# Patient Record
Sex: FEMALE | Race: BLACK OR AFRICAN AMERICAN | Employment: UNEMPLOYED | URBAN - METROPOLITAN AREA
[De-identification: names, ages, dates, MRNs, and addresses within clinical notes are randomized per-mention and may not be internally consistent; named-entity substitution may affect disease eponyms.]

---
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
## 2022-02-27 ENCOUNTER — HOSPITAL ENCOUNTER (EMERGENCY)
Age: 22
Discharge: HOME | End: 2022-02-27
Attending: STUDENT IN AN ORGANIZED HEALTH CARE EDUCATION/TRAINING PROGRAM
Payer: COMMERCIAL

## 2022-02-27 VITALS
HEIGHT: 62 IN | TEMPERATURE: 98.8 F | RESPIRATION RATE: 18 BRPM | WEIGHT: 177.69 LBS | HEART RATE: 92 BPM | SYSTOLIC BLOOD PRESSURE: 127 MMHG | BODY MASS INDEX: 32.7 KG/M2 | DIASTOLIC BLOOD PRESSURE: 74 MMHG | OXYGEN SATURATION: 100 %

## 2022-02-27 DIAGNOSIS — S46.911A STRAIN OF RIGHT UPPER ARM, INITIAL ENCOUNTER: ICD-10-CM
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
## 2022-02-27 DIAGNOSIS — S16.1XXA STRAIN OF NECK MUSCLE, INITIAL ENCOUNTER: Primary | ICD-10-CM

## 2022-02-27 DIAGNOSIS — M54.9 MUSCULOSKELETAL BACK PAIN: ICD-10-CM

## 2022-02-27 PROCEDURE — 74011250636 HC RX REV CODE- 250/636: Performed by: STUDENT IN AN ORGANIZED HEALTH CARE EDUCATION/TRAINING PROGRAM

## 2022-02-27 PROCEDURE — 96372 THER/PROPH/DIAG INJ SC/IM: CPT

## 2022-02-27 PROCEDURE — 99284 EMERGENCY DEPT VISIT MOD MDM: CPT

## 2022-02-27 RX ORDER — METHOCARBAMOL 500 MG/1
500 TABLET, FILM COATED ORAL
Qty: 30 TABLET | Refills: 0 | OUTPATIENT
Start: 2022-02-27 | End: 2022-05-23

## 2022-02-27 RX ORDER — KETOROLAC TROMETHAMINE 30 MG/ML
30 INJECTION, SOLUTION INTRAMUSCULAR; INTRAVENOUS
Status: COMPLETED | OUTPATIENT
Start: 2022-02-27 | End: 2022-02-27

## 2022-02-27 RX ORDER — LIDOCAINE 40 MG/G
1 CREAM TOPICAL
Qty: 15 G | Refills: 0 | OUTPATIENT
Start: 2022-02-27 | End: 2022-05-23

## 2022-02-27 RX ORDER — NAPROXEN 500 MG/1
500 TABLET ORAL
Qty: 20 TABLET | Refills: 0 | OUTPATIENT
Start: 2022-02-27 | End: 2022-05-23

## 2022-02-27 RX ADMIN — KETOROLAC TROMETHAMINE 30 MG: 30 INJECTION, SOLUTION INTRAMUSCULAR; INTRAVENOUS at 08:06

## 2022-02-27 NOTE — Clinical Note
[facility] EMERGENCY DEPT  [address] [facility] [id number] [phone]    Work/School Note    Date: 2/27/2022    To Whom It May concern:      Governor Wolf was seen and treated today in the emergency room by the following provider(s):  Attending Provider: Kushal Contreras MD.      Governor Wolf is excused from work/school on 02/27/22. She is clear to return to work/school on 02/28/22.         Sincerely,          Vivian Persaud MD

## 2022-02-27 NOTE — ED PROVIDER NOTES
EMERGENCY DEPARTMENT HISTORY AND PHYSICAL EXAM      Date: 2/27/2022  Patient Name: Hector Gama    History of Presenting Illness     Chief Complaint   Patient presents with    Back Pain     History Provided By: Patient    HPI: Hector Gama, 25 y.o. female with past medical history as listed below presents to the ED with cc of neck pain, diffuse back pain, and right upper arm pain. Patient reports symptoms began yesterday. Describes pain characteristic as a muscle soreness. she denies any specific injury or trauma to the affected areas. 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 States that these food trucks are rather heavy. In addition, patient also reports being mother of several young children, whom she has to hold for extended periods of time in her arms at home. She is right-handed. Patient reports taking ibuprofen last night and this morning, without significant relief in her symptoms. PCP: Yogesh Rowland MD    No current facility-administered medications on file prior to encounter. Current Outpatient Medications on File Prior to Encounter   Medication Sig Dispense Refill    ondansetron (Zofran ODT) 8 mg disintegrating tablet Take 1 Tablet by mouth every eight (8) hours as needed for Nausea or Vomiting. 12 Tablet 0    guaiFENesin (ROBITUSSIN) 100 mg/5 mL liquid Take 20 mL by mouth three (3) times daily as needed for Cough. (Patient not taking: Reported on 10/20/2021) 1 Bottle 0    labetaloL (NORMODYNE) 200 mg tablet Take 1 Tab by mouth every twelve (12) hours. (Patient not taking: Reported on 10/20/2021) 60 Tab 1    acetaminophen (TYLENOL) 325 mg tablet Take 2 Tabs by mouth every four (4) hours as needed for Pain. 40 Tab 1    fluticasone propion-salmeteroL (ADVAIR/WIXELA) 250-50 mcg/dose diskus inhaler Take 1 Puff by inhalation two (2) times a day. 1 Inhaler 1    sertraline (Zoloft) 50 mg tablet Take 50 mg by mouth daily.       docusate sodium (Colace) 100 mg capsule Take 100 mg by mouth two (2) times a day. (Patient not taking: Reported on 10/20/2021)      PNV OR.95/PGVZDYK fum/folic ac (PRENATAL PO) Take  by mouth.  Lisdexamfetamine (Vyvanse) 70 mg cap 40 mg daily.  Cetirizine (ZYRTEC) 10 mg cap Take  by mouth.  oxcarbazepine (TRILEPTAL) 150 mg tablet 450 mg daily. Past History     Past Medical History:  Past Medical History:   Diagnosis Date    ADHD (attention deficit hyperactivity disorder)     Allergic rhinitis     Asthma     Depression     Epilepsy ([facility] [address]     last at age 11    Hyperhidrosis     Non-insulin-dependent diabetes mellitus without complications     reports pre-diabetic    Pre-eclampsia, antepartum, third trimester 2/13/2021    Psychiatric disorder     ADHD    Seizures ([facility] [address]        Past Surgical History:  Past Surgical History:   Procedure Laterality Date    HX OTHER SURGICAL      reports colon surgery \"flushed me out\"       Family History:  Family History   Problem Relation Age of Onset    Seizures Brother     Hypertension Maternal Grandmother     Hypertension Mother     Hypertension Father     Diabetes Neg Hx     Elevated Lipids Neg Hx     Thyroid Disease Neg Hx        Social History:  Social History     Tobacco Use    Smoking status: Never Smoker    Smokeless tobacco: Never Used   Vaping Use    Vaping Use: Never used   Substance Use Topics    Alcohol use: No    Drug use: No       Allergies: Allergies   Allergen Reactions    Codeine Rash    Pcn [Penicillins] Rash         Review of Systems   Review of Systems   Constitutional: Negative for chills and fever. HENT: Negative for congestion and rhinorrhea. Eyes: Negative for visual disturbance. Respiratory: Negative for chest tightness and shortness of breath. Cardiovascular: Negative for chest pain and palpitations. Gastrointestinal: Negative for abdominal pain, diarrhea, nausea and vomiting.    Genitourinary: Negative for dysuria, flank pain and hematuria. Musculoskeletal: Positive for myalgias and neck pain. Negative for back pain, joint swelling and neck stiffness. Skin: Negative for rash. Allergic/Immunologic: Negative for immunocompromised state. Neurological: Negative for dizziness, speech difficulty, weakness and headaches. Hematological: Negative for adenopathy. Psychiatric/Behavioral: Negative for dysphoric mood and suicidal ideas. Physical Exam   Physical Exam  Vitals and nursing note reviewed. Constitutional:       General: She is not in acute distress. Appearance: She is not ill-appearing or toxic-appearing. HENT:      Head: Normocephalic and atraumatic. Nose: Nose normal.      Mouth/Throat:      Mouth: Mucous membranes are moist.   Eyes:      Extraocular Movements: Extraocular movements intact. Pupils: Pupils are equal, round, and reactive to light. Cardiovascular:      Rate and Rhythm: Normal rate and regular rhythm. Pulses: Normal pulses. Pulmonary:      Effort: Pulmonary effort is normal.      Breath sounds: No stridor. No wheezing or rhonchi. Abdominal:      General: Abdomen is flat. There is no distension. Tenderness: There is no abdominal tenderness. Musculoskeletal:         General: Normal range of motion. Right upper arm: Tenderness present. No swelling, edema, deformity, lacerations or bony tenderness. Left upper arm: Normal.      Cervical back: Normal range of motion and neck supple. No rigidity, bony tenderness or crepitus. Muscular tenderness present. No pain with movement or spinous process tenderness. Normal range of motion. Thoracic back: Tenderness present. No swelling, edema, deformity, signs of trauma or bony tenderness. Normal range of motion. Lumbar back: Tenderness present. No swelling, edema, deformity, signs of trauma, lacerations, spasms or bony tenderness. Normal range of motion.  Negative right straight leg raise test and negative left straight leg raise test. No scoliosis. Skin:     General: Skin is warm and dry. Neurological:      General: No focal deficit present. Mental Status: She is alert and oriented to person, place, and time. Psychiatric:         Judgment: Judgment normal.         Diagnostic Study Results     Labs -   No results found for this or any previous visit (from the past 24 hour(s)). Radiologic Studies -   No orders to display     CT Results  (Last 48 hours)    None        CXR Results  (Last 48 hours)    None          Medical Decision Making   IBolivar MD am the first provider for this patient, and I am the attending of record for this patient encounter. I reviewed the vital signs, available nursing notes, past medical history, past surgical history, family history and social history. Vital Signs-Reviewed the patient's vital signs. Patient Vitals for the past 24 hrs:   Temp Pulse Resp BP SpO2   02/27/22 0742 98.8 °F (37.1 °C) 92 18 127/74 100 %     Records Reviewed: Nursing Notes and Old Medical Records    Provider Notes (Medical Decision Making):   DDx: muscle strain, DDD, arthritis, overuse injury, tendon/ligamentous injury    Patient symptoms today are most likely secondary to musculoskeletal strain from overuse injury. She reports no preceding trauma to affected areas. No indication to obtain imaging studies. We will treat patient's symptoms with IM Toradol in the ED. Will discharge with Rx for lidocaine cream, naproxen, along with Robaxin. Follow-up with PCP advised. Will provide work note for today. ED Course:   Initial assessment performed. The patient's presenting problems have been discussed, and they are in agreement with the care plan formulated and outlined with them. I have encouraged them to ask questions as they arise throughout their visit. Bolivar Wyatt MD      Disposition:  Discharge      DISCHARGE PLAN:  1.    Current Discharge Medication List START taking these medications    Details   naproxen (NAPROSYN) 500 mg tablet Take 1 Tablet by mouth every twelve (12) hours as needed for Pain. Qty: 20 Tablet, Refills: 0  Start date: 2/27/2022      methocarbamoL (Robaxin) 500 mg tablet Take 1 Tablet by mouth three (3) times daily as needed for Muscle Spasm(s) or Pain. Qty: 30 Tablet, Refills: 0  Start date: 2/27/2022      lidocaine (XYLOCAINE) 4 % topical cream Apply 1 Tube to affected area four (4) times daily as needed for Pain. Qty: 15 g, Refills: 0  Start date: 2/27/2022           2. Follow-up Information     Follow up With Specialties Details Why Contact Info    Ricky Rosa MD Pediatric Medicine In 2 days  [address] [facility]  [address] [place] [address] [id number]          3. Return to ED if worse     Diagnosis     Clinical Impression:   1. Strain of neck muscle, initial encounter    2. Musculoskeletal back pain    3. Strain of right upper arm, initial encounter    4. Overuse injury        Attestations:    Lo Elizalde MD    Please note that this dictation was completed with [employer]ie, the [employer] voice recognition software. Quite often unanticipated grammatical, syntax, homophones, and other interpretive errors are inadvertently transcribed by the computer software. Please disregard these errors. Please excuse any errors that have escaped final proofreading. Thank you.

## 2022-02-27 NOTE — ED TRIAGE NOTES
Patient comes to the ED for evaluation and treatment of generalized back and bilateral arm pain causing her to leave her \"job\" this am.  Denied injury or trauma.   Stated vague nausea and \"weak\" feeling this am

## 2022-03-18 PROBLEM — O30.043 DICHORIONIC DIAMNIOTIC TWIN PREGNANCY IN THIRD TRIMESTER: Status: ACTIVE | Noted: 2021-02-13

## 2022-03-19 PROBLEM — O14.93 PRE-ECLAMPSIA, ANTEPARTUM, THIRD TRIMESTER: Status: ACTIVE | Noted: 2021-02-13

## 2022-03-19 PROBLEM — O14.93 PRE-ECLAMPSIA IN THIRD TRIMESTER: Status: ACTIVE | Noted: 2021-02-16

## 2022-03-19 PROBLEM — R73.9 ELEVATED BLOOD SUGAR LEVEL: Status: ACTIVE | Noted: 2017-10-18

## 2022-03-19 PROBLEM — O30.049 TWIN GESTATION, DICHORIONIC DIAMNIOTIC: Status: ACTIVE | Noted: 2021-02-16

## 2022-03-20 PROBLEM — O14.93 PREECLAMPSIA, THIRD TRIMESTER: Status: ACTIVE | Noted: 2021-02-16

## 2022-03-20 PROBLEM — O30.049 TWIN PREGNANCY, TWINS DICHORIONIC AND DIAMNIOTIC: Status: ACTIVE | Noted: 2021-02-16

## 2022-04-11 ENCOUNTER — HOSPITAL ENCOUNTER (EMERGENCY)
Age: 22
Discharge: HOME | End: 2022-04-11
Attending: EMERGENCY MEDICINE
Payer: COMMERCIAL

## 2022-04-11 VITALS
TEMPERATURE: 98.8 F | SYSTOLIC BLOOD PRESSURE: 118 MMHG | RESPIRATION RATE: 18 BRPM | HEART RATE: 86 BPM | OXYGEN SATURATION: 99 % | BODY MASS INDEX: 32.76 KG/M2 | HEIGHT: 62 IN | WEIGHT: 178 LBS | DIASTOLIC BLOOD PRESSURE: 71 MMHG

## 2022-04-11 DIAGNOSIS — J06.9 VIRAL UPPER RESPIRATORY TRACT INFECTION WITH COUGH: Primary | ICD-10-CM

## 2022-04-11 PROCEDURE — 99283 EMERGENCY DEPT VISIT LOW MDM: CPT

## 2022-04-11 RX ORDER — PSEUDOEPHEDRINE HCL 120 MG/1
120 TABLET, FILM COATED, EXTENDED RELEASE ORAL
Qty: 14 TABLET | Refills: 0 | Status: SHIPPED | OUTPATIENT
Start: 2022-04-11

## 2022-04-11 RX ORDER — BENZONATATE 100 MG/1
100 CAPSULE ORAL
Qty: 30 CAPSULE | Refills: 0 | Status: SHIPPED | OUTPATIENT
Start: 2022-04-11 | End: 2022-04-18

## 2022-04-12 NOTE — ED NOTES
Emergency Department Nursing Plan of Care       The Nursing Plan of Care is developed from the Nursing assessment and Emergency Department Attending provider initial evaluation. The plan of care may be reviewed in the ED Provider note.     The Plan of Care was developed with the following considerations:   Patient / Family readiness to learn indicated by:verbalized understanding  Persons(s) to be included in education: patient  Barriers to Learning/Limitations:No    Signed     Shayan Hernandez RN    4/11/2022   9:07 PM

## 2022-04-12 NOTE — ED PROVIDER NOTES
EMERGENCY DEPARTMENT HISTORY AND PHYSICAL EXAM      Date: 4/11/2022  Patient Name: Liane Castillo    History of Presenting Illness     Chief Complaint   Patient presents with    Cold Symptoms     congestion for a few days       History Provided By: Patient    HPI: Liane Castillo, 25 y.o. female with PMHx significant for asthma, presents to the ED with cc of URI symptoms for 3-4 days. She reports nasal congestion and productive cough with yellow sputum. Symptoms are gradually worsening. She has tried Mucinex, [place] cold, and Robitussin without relief of symptoms. She notes that her baby was sick with these symptoms first and passed it on to her. She denies fevers, sore throat, shortness of breath, chest pain. There are no other complaints, changes, or physical findings at this time. PCP: Jean Estrada MD    No current facility-administered medications on file prior to encounter. Current Outpatient Medications on File Prior to Encounter   Medication Sig Dispense Refill    naproxen (NAPROSYN) 500 mg tablet Take 1 Tablet by mouth every twelve (12) hours as needed for Pain. (Patient not taking: Reported on 4/11/2022) 20 Tablet 0    methocarbamoL (Robaxin) 500 mg tablet Take 1 Tablet by mouth three (3) times daily as needed for Muscle Spasm(s) or Pain. (Patient not taking: Reported on 4/11/2022) 30 Tablet 0    lidocaine (XYLOCAINE) 4 % topical cream Apply 1 Tube to affected area four (4) times daily as needed for Pain. (Patient not taking: Reported on 4/11/2022) 15 g 0    ondansetron (Zofran ODT) 8 mg disintegrating tablet Take 1 Tablet by mouth every eight (8) hours as needed for Nausea or Vomiting. (Patient not taking: Reported on 4/11/2022) 12 Tablet 0    guaiFENesin (ROBITUSSIN) 100 mg/5 mL liquid Take 20 mL by mouth three (3) times daily as needed for Cough.  (Patient not taking: Reported on 10/20/2021) 1 Bottle 0    labetaloL (NORMODYNE) 200 mg tablet Take 1 Tab by mouth every twelve (12) hours. (Patient not taking: Reported on 10/20/2021) 60 Tab 1    acetaminophen (TYLENOL) 325 mg tablet Take 2 Tabs by mouth every four (4) hours as needed for Pain. (Patient not taking: Reported on 4/11/2022) 40 Tab 1    fluticasone propion-salmeteroL (ADVAIR/WIXELA) 250-50 mcg/dose diskus inhaler Take 1 Puff by inhalation two (2) times a day. 1 Inhaler 1    sertraline (Zoloft) 50 mg tablet Take 50 mg by mouth daily. (Patient not taking: Reported on 4/11/2022)      docusate sodium (Colace) 100 mg capsule Take 100 mg by mouth two (2) times a day. (Patient not taking: Reported on 10/20/2021)      PNV QV.11/VLTLLLL fum/folic ac (PRENATAL PO) Take  by mouth. (Patient not taking: Reported on 4/11/2022)      Lisdexamfetamine (Vyvanse) 70 mg cap 40 mg daily. (Patient not taking: Reported on 4/11/2022)      Cetirizine (ZYRTEC) 10 mg cap Take  by mouth.  oxcarbazepine (TRILEPTAL) 150 mg tablet 450 mg daily.  (Patient not taking: Reported on 4/11/2022)         Past History     Past Medical History:  Past Medical History:   Diagnosis Date    ADHD (attention deficit hyperactivity disorder)     Allergic rhinitis     Asthma     Depression     Epilepsy ([facility] [address]     last at age 11    Hyperhidrosis     Non-insulin-dependent diabetes mellitus without complications     reports pre-diabetic    Pre-eclampsia, antepartum, third trimester 2/13/2021    Psychiatric disorder     ADHD    Seizures ([facility] [address]        Past Surgical History:  Past Surgical History:   Procedure Laterality Date    HX OTHER SURGICAL      reports colon surgery \"flushed me out\"       Family History:  Family History   Problem Relation Age of Onset    Seizures Brother     Hypertension Maternal Grandmother     Hypertension Mother     Hypertension Father     Diabetes Neg Hx     Elevated Lipids Neg Hx     Thyroid Disease Neg Hx        Social History:  Social History     Tobacco Use    Smoking status: Never Smoker    Smokeless tobacco: Never Used Vaping Use    Vaping Use: Never used   Substance Use Topics    Alcohol use: No    Drug use: No       Allergies: Allergies   Allergen Reactions    Codeine Rash    Pcn [Penicillins] Rash         Review of Systems   Review of Systems   Constitutional: Negative for chills and fever. HENT: Positive for congestion and sore throat. Negative for ear pain. Eyes: Negative for redness and visual disturbance. Respiratory: Positive for cough. Negative for shortness of breath. Cardiovascular: Negative for chest pain and palpitations. Gastrointestinal: Negative for abdominal pain, nausea and vomiting. Genitourinary: Negative for dysuria and hematuria. Musculoskeletal: Negative for back pain and gait problem. Skin: Negative for rash and wound. Neurological: Negative for dizziness and headaches. Psychiatric/Behavioral: Negative for behavioral problems and confusion. All other systems reviewed and are negative. Physical Exam   Physical Exam  Constitutional:       Appearance: She is not toxic-appearing. Comments: Interactive, well-appearing female. HENT:      Head: Normocephalic and atraumatic. Nose: Congestion present. Mouth/Throat:      Mouth: Mucous membranes are moist.   Eyes:      Extraocular Movements: Extraocular movements intact. Pupils: Pupils are equal, round, and reactive to light. Cardiovascular:      Rate and Rhythm: Normal rate and regular rhythm. Heart sounds: Normal heart sounds. No murmur heard. Pulmonary:      Effort: Pulmonary effort is normal. No respiratory distress. Breath sounds: Normal breath sounds. No stridor. No wheezing or rales. Musculoskeletal:         General: No deformity. Normal range of motion. Cervical back: Normal range of motion and neck supple. Skin:     General: Skin is warm and dry. Neurological:      General: No focal deficit present. Mental Status: She is alert and oriented to person, place, and time. Psychiatric:         Behavior: Behavior normal.           Diagnostic Study Results     Labs -   No results found for this or any previous visit (from the past 12 hour(s)). Radiologic Studies -   No orders to display     CT Results  (Last 48 hours)    None        CXR Results  (Last 48 hours)    None            Medical Decision Making   I am the first provider for this patient. I reviewed the vital signs, available nursing notes, past medical history, past surgical history, family history and social history. Vital Signs-Reviewed the patient's vital signs. Patient Vitals for the past 12 hrs:   Temp Pulse Resp BP SpO2   04/11/22 2033 98.8 °F (37.1 °C) 86 18 118/71 99 %         Records Reviewed: Nursing Notes and Old Medical Records      Provider Notes (Medical Decision Making):   DDx: Viral upper respiratory infection, sinusitis, seasonal allergic rhinitis    Patient presents with URI symptoms. She is afebrile and clinically well-appearing. She declines Covid or influenza testing at this time. Discussed supportive treatment, follow-up, and return precautions. ED Course:   Initial assessment performed. The patients presenting problems have been discussed, and they are in agreement with the care plan formulated and outlined with them. I have encouraged them to ask questions as they arise throughout their visit. Disposition:  9:00 PM  The patient has been re-evaluated and is ready for discharge. Reviewed available results with patient. Counseled patient on diagnosis and care plan. Patient has expressed understanding, and all questions have been answered. Patient agrees with plan and agrees to follow up as recommended, or to return to the ED if their symptoms worsen. Discharge instructions have been provided and explained to the patient, along with reasons to return to the ED. PLAN:  1.    Discharge Medication List as of 4/11/2022  9:00 PM      START taking these medications    Details benzonatate (Tessalon Perles) 100 mg capsule Take 1 Capsule by mouth three (3) times daily as needed for Cough for up to 7 days. , Normal, Disp-30 Capsule, R-0      pseudoephedrine CR (Sudafed 12 Hour) 120 mg CR tablet Take 1 Tablet by mouth two (2) times daily as needed for Congestion. , Normal, Disp-14 Tablet, R-0         CONTINUE these medications which have NOT CHANGED    Details   naproxen (NAPROSYN) 500 mg tablet Take 1 Tablet by mouth every twelve (12) hours as needed for Pain., Normal, Disp-20 Tablet, R-0      methocarbamoL (Robaxin) 500 mg tablet Take 1 Tablet by mouth three (3) times daily as needed for Muscle Spasm(s) or Pain., Normal, Disp-30 Tablet, R-0      lidocaine (XYLOCAINE) 4 % topical cream Apply 1 Tube to affected area four (4) times daily as needed for Pain., Normal, Disp-15 g, R-0      ondansetron (Zofran ODT) 8 mg disintegrating tablet Take 1 Tablet by mouth every eight (8) hours as needed for Nausea or Vomiting., Normal, Disp-12 Tablet, R-0      guaiFENesin (ROBITUSSIN) 100 mg/5 mL liquid Take 20 mL by mouth three (3) times daily as needed for Cough., Normal, Disp-1 Bottle, R-0      labetaloL (NORMODYNE) 200 mg tablet Take 1 Tab by mouth every twelve (12) hours. , Normal, Disp-60 Tab, R-1      acetaminophen (TYLENOL) 325 mg tablet Take 2 Tabs by mouth every four (4) hours as needed for Pain., Normal, Disp-40 Tab, R-1      fluticasone propion-salmeteroL (ADVAIR/WIXELA) 250-50 mcg/dose diskus inhaler Take 1 Puff by inhalation two (2) times a day., Normal, Disp-1 Inhaler, R-1      sertraline (Zoloft) 50 mg tablet Take 50 mg by mouth daily. , Historical Med      docusate sodium (Colace) 100 mg capsule Take 100 mg by mouth two (2) times a day., Historical Med      PNV OW.53/NAAGFEC fum/folic ac (PRENATAL PO) Take  by mouth., Historical Med      Lisdexamfetamine (Vyvanse) 70 mg cap 40 mg daily. , Historical Med      Cetirizine (ZYRTEC) 10 mg cap Take  by mouth., Historical Med      oxcarbazepine (TRILEPTAL) 150 mg tablet 450 mg daily. , Historical Med           2. Follow-up Information     Follow up With Specialties Details Why Contact Info    India Lawler MD Pediatric Medicine Schedule an appointment as soon as possible for a visit in 1 week  [address] - [place] EMERGENCY DEPT Emergency Medicine Go to  If symptoms worsen [facility]  [phone]        Return to ED if worse     Diagnosis     Clinical Impression:   1. Viral upper respiratory tract infection with cough            Dolly Henry.  ANA Whitaker

## 2022-04-12 NOTE — ED TRIAGE NOTES
Pt presents to ED with c/o cough and congestion for a few days. Pt reports taking Robitussin and Mucinex.

## 2022-05-23 ENCOUNTER — HOSPITAL ENCOUNTER (EMERGENCY)
Age: 22
Discharge: HOME | End: 2022-05-23
Attending: EMERGENCY MEDICINE
Payer: COMMERCIAL

## 2022-05-23 ENCOUNTER — APPOINTMENT (OUTPATIENT)
Dept: GENERAL RADIOLOGY | Age: 22
End: 2022-05-23
Attending: PHYSICIAN ASSISTANT
Payer: COMMERCIAL

## 2022-05-23 VITALS
HEIGHT: 62 IN | TEMPERATURE: 98.6 F | SYSTOLIC BLOOD PRESSURE: 109 MMHG | BODY MASS INDEX: 32.76 KG/M2 | WEIGHT: 178 LBS | RESPIRATION RATE: 17 BRPM | OXYGEN SATURATION: 99 % | DIASTOLIC BLOOD PRESSURE: 67 MMHG | HEART RATE: 94 BPM

## 2022-05-23 DIAGNOSIS — J40 BRONCHITIS: Primary | ICD-10-CM

## 2022-05-23 PROCEDURE — 99283 EMERGENCY DEPT VISIT LOW MDM: CPT

## 2022-05-23 PROCEDURE — 71046 X-RAY EXAM CHEST 2 VIEWS: CPT

## 2022-05-23 RX ORDER — METHYLPREDNISOLONE 4 MG/1
TABLET ORAL
Qty: 1 DOSE PACK | Refills: 0 | OUTPATIENT
Start: 2022-05-23 | End: 2022-08-25

## 2022-05-23 RX ORDER — DOXYCYCLINE HYCLATE 100 MG
100 TABLET ORAL 2 TIMES DAILY
Qty: 14 TABLET | Refills: 0 | Status: SHIPPED | OUTPATIENT
Start: 2022-05-23 | End: 2022-05-30

## 2022-05-23 NOTE — ED TRIAGE NOTES
Patient arrives with complaints of sneezing and productive cough that started \"a few weeks ago\" and reports taking OTC medication with no relief. Patient denies fever or SOB at this time.

## 2022-05-23 NOTE — ED PROVIDER NOTES
EMERGENCY DEPARTMENT HISTORY AND PHYSICAL EXAM    Date: 5/23/2022  Patient Name: Nadja Everett    History of Presenting Illness     Chief Complaint   Patient presents with    Cough         History Provided By: Patient    HPI: Nadja Everett is a 25 y.o. female with a PMH of ADHD, seizures, asthma, depression who presents with cough since last visit in the ED last month. Patient states she has been taking over-the-counter cough medicine as well as prescribed medication with no relief. She denies any chest pain, shortness of breath, high fevers, chills, nausea, vomiting. She does report that her temp been in the 99's and she works at a nursing home. Patient is not a smoker. She states she has been using her Advair as well with no relief. PCP: Destini Dominguez MD    Current Outpatient Medications   Medication Sig Dispense Refill    methylPREDNISolone (Medrol, Sheldon,) 4 mg tablet Take as instructed 1 Dose Pack 0    doxycycline (VIBRA-TABS) 100 mg tablet Take 1 Tablet by mouth two (2) times a day for 7 days. 14 Tablet 0    pseudoephedrine CR (Sudafed 12 Hour) 120 mg CR tablet Take 1 Tablet by mouth two (2) times daily as needed for Congestion. 14 Tablet 0    fluticasone propion-salmeteroL (ADVAIR/WIXELA) 250-50 mcg/dose diskus inhaler Take 1 Puff by inhalation two (2) times a day. 1 Inhaler 1    Cetirizine (ZYRTEC) 10 mg cap Take  by mouth.          Past History     Past Medical History:  Past Medical History:   Diagnosis Date    ADHD (attention deficit hyperactivity disorder)     Allergic rhinitis     Asthma     Depression     Epilepsy ([facility] [address]     last at age 11    Hyperhidrosis     Non-insulin-dependent diabetes mellitus without complications     reports pre-diabetic    Pre-eclampsia, antepartum, third trimester 2/13/2021    Psychiatric disorder     ADHD    Seizures ([facility] [address]        Past Surgical History:  Past Surgical History:   Procedure Laterality Date    HX OTHER SURGICAL      reports colon surgery \"flushed me out\"       Family History:  Family History   Problem Relation Age of Onset    Seizures Brother     Hypertension Maternal Grandmother     Hypertension Mother     Hypertension Father     Diabetes Neg Hx     Elevated Lipids Neg Hx     Thyroid Disease Neg Hx        Social History:  Social History     Tobacco Use    Smoking status: Never Smoker    Smokeless tobacco: Never Used   Vaping Use    Vaping Use: Never used   Substance Use Topics    Alcohol use: No    Drug use: No       Allergies: Allergies   Allergen Reactions    Codeine Rash    Pcn [Penicillins] Rash         Review of Systems   Review of Systems   Constitutional: Negative for chills and fever. Respiratory: Positive for cough. Negative for shortness of breath and wheezing. Cardiovascular: Negative for chest pain. Gastrointestinal: Negative for nausea and vomiting. Allergic/Immunologic: Negative for immunocompromised state. Neurological: Negative for speech difficulty and weakness. All other systems reviewed and are negative. Physical Exam     Vitals:    05/23/22 0901   BP: 109/67   Pulse: 94   Resp: 17   Temp: 98.6 °F (37 °C)   SpO2: 99%   Weight: 80.7 kg (178 lb)   Height: 5' 2\" (1.575 m)     Physical Exam  Vitals and nursing note reviewed. Constitutional:       General: She is not in acute distress. Appearance: She is well-developed. HENT:      Head: Normocephalic and atraumatic. Eyes:      Conjunctiva/sclera: Conjunctivae normal.   Cardiovascular:      Rate and Rhythm: Normal rate and regular rhythm. Heart sounds: Normal heart sounds. Pulmonary:      Effort: Pulmonary effort is normal. No respiratory distress. Breath sounds: Normal breath sounds. No wheezing or rales. Skin:     General: Skin is warm and dry. Neurological:      Mental Status: She is alert and oriented to person, place, and time. Psychiatric:         Behavior: Behavior normal.         Thought Content:  Thought content normal.         Judgment: Judgment normal.           Diagnostic Study Results     Labs -   No results found for this or any previous visit (from the past 12 hour(s)). Radiologic Studies -   XR CHEST PA LAT   Final Result   Normal chest.        CT Results  (Last 48 hours)    None        CXR Results  (Last 48 hours)               05/23/22 0937  XR CHEST PA LAT Final result    Impression:  Normal chest.       Narrative:  EXAM: XR CHEST PA LAT       INDICATION: cough x one month        COMPARISON: 9/22/2019. FINDINGS: PA and lateral radiographs of the chest demonstrate clear lungs. The   cardiac and mediastinal contours and pulmonary vascularity are normal. The bones   and soft tissues are within normal limits. Medical Decision Making   I am the first provider for this patient. I reviewed the vital signs, available nursing notes, past medical history, past surgical history, family history and social history. Vital Signs-Reviewed the patient's vital signs. Records Reviewed: Nursing Notes and Old Medical Records    Provider Notes (Medical Decision Making):   Presents for cough for over a month that is now starting to become productive. She is not a smoker and has been seen last month for the same. She states she got cough medicines at her last ED visit but they were not helping and she continues to cough. She is also tried over-the-counter meds with no relief. Will get chest x-ray to evaluate but symptoms seem consistent with bronchitis so patient will likely need steroids and an antibiotic at this point. Other DDx to consider, pneumonia, URI, asthma exacerbation, less likely COVID or influenza with length of time of symptoms          Disposition:  Discharged    DISCHARGE NOTE:   955 AM      Care plan outlined and precautions discussed. Patient has no new complaints, changes, or physical findings. Results of chest x-ray were reviewed with the patient.  All medications were reviewed with the patient; will d/c home. All of pt's questions and concerns were addressed. Patient was instructed and agrees to follow up with PCP as needed, as well as to return to the ED upon further deterioration. Patient is ready to go home. Follow-up Information     Follow up With Specialties Details Why Contact Info    [facility]   As needed [address], [address] [place] [id number] [phone]    [facility] [place] EMERGENCY DEPT Emergency Medicine  If symptoms worsen Enzo Tyler          Discharge Medication List as of 5/23/2022  9:55 AM      START taking these medications    Details   methylPREDNISolone (Medrol, Sheldon,) 4 mg tablet Take as instructed, Normal, Disp-1 Dose Pack, R-0      doxycycline (VIBRA-TABS) 100 mg tablet Take 1 Tablet by mouth two (2) times a day for 7 days. , Normal, Disp-14 Tablet, R-0         CONTINUE these medications which have NOT CHANGED    Details   pseudoephedrine CR (Sudafed 12 Hour) 120 mg CR tablet Take 1 Tablet by mouth two (2) times daily as needed for Congestion. , Normal, Disp-14 Tablet, R-0      fluticasone propion-salmeteroL (ADVAIR/WIXELA) 250-50 mcg/dose diskus inhaler Take 1 Puff by inhalation two (2) times a day., Normal, Disp-1 Inhaler, R-1      Cetirizine (ZYRTEC) 10 mg cap Take  by mouth., Historical Med             Procedures:  Procedures    Please note that this dictation was completed with Dragon, computer voice recognition software. Quite often unanticipated grammatical, syntax, homophones, and other interpretive errors are inadvertently transcribed by the computer software. Please disregard these errors. Additionally, please excuse any errors that have escaped final proofreading. Diagnosis     Clinical Impression:   1.  Bronchitis

## 2022-05-23 NOTE — ED NOTES
Pt presents ambulatory to ED complaining of a productive cough with clear or yellow sputum for about the past month. Pt denies smoking or being in any pain. Pt reports trying over the counter cough medicine without getting any relief. Pt is alert and oriented x 4, RR even and unlabored, skin is warm and dry. Assesment completed and pt updated on plan of care. Emergency Department Nursing Plan of Care       The Nursing Plan of Care is developed from the Nursing assessment and Emergency Department Attending provider initial evaluation. The plan of care may be reviewed in the ED Provider note.     The Plan of Care was developed with the following considerations:   Patient / Family readiness to learn indicated by:verbalized understanding  Persons(s) to be included in education: patient  Barriers to Learning/Limitations:No    Signed     Terri Zurita RN    5/23/2022   9:55 AM

## 2022-05-23 NOTE — ED NOTES
Discharge instructions were given to the patient by Maru Hinojosa RN. The patient left the Emergency Department ambulatory, alert and oriented and in no acute distress with 2 prescriptions. The patient was encouraged to call or return to the ED for worsening issues or problems and was encouraged to schedule a follow up appointment for continuing care. The patient verbalized understanding of discharge instructions and prescriptions, all questions were answered. The patient has no further concerns at this time.

## 2022-07-11 ENCOUNTER — HOSPITAL ENCOUNTER (EMERGENCY)
Age: 22
Discharge: HOME | End: 2022-07-11
Attending: EMERGENCY MEDICINE
Payer: COMMERCIAL

## 2022-07-11 VITALS
TEMPERATURE: 98.5 F | DIASTOLIC BLOOD PRESSURE: 75 MMHG | WEIGHT: 184 LBS | HEIGHT: 62 IN | RESPIRATION RATE: 16 BRPM | BODY MASS INDEX: 33.86 KG/M2 | SYSTOLIC BLOOD PRESSURE: 137 MMHG | HEART RATE: 81 BPM | OXYGEN SATURATION: 98 %

## 2022-07-11 DIAGNOSIS — R25.2 LEG CRAMPS: Primary | ICD-10-CM

## 2022-07-11 DIAGNOSIS — M54.50 ACUTE MIDLINE LOW BACK PAIN WITHOUT SCIATICA: ICD-10-CM

## 2022-07-11 PROCEDURE — 99283 EMERGENCY DEPT VISIT LOW MDM: CPT

## 2022-07-11 RX ORDER — CYCLOBENZAPRINE HCL 5 MG
5 TABLET ORAL
Qty: 20 TABLET | Refills: 0 | OUTPATIENT
Start: 2022-07-11 | End: 2022-08-25

## 2022-07-11 RX ORDER — IBUPROFEN 600 MG/1
600 TABLET ORAL
Qty: 20 TABLET | Refills: 0 | OUTPATIENT
Start: 2022-07-11 | End: 2022-08-25

## 2022-07-11 NOTE — LETTER
[facility] EMERGENCY DEPT  [address] [id number] [phone]    Work/School Note    Date: 7/11/2022    To Whom It May concern:    Feng Lopez was seen and treated today in the emergency room by the following provider(s):  Attending Provider: Tisha Henao MD  Physician Assistant: TREVON Dominguez.      Feng Lopez may return to work on [id number].     Sincerely,          TREVON Quintana

## 2022-07-11 NOTE — ED TRIAGE NOTES
Pt came into ED ambulatory complaining of generalzied body aches x today. Pt also reports abdominal pain. Pt denies fevers, chills, or N/V/D.

## 2022-07-11 NOTE — ED PROVIDER NOTES
EMERGENCY DEPARTMENT HISTORY AND PHYSICAL EXAM      Date: 2022  Patient Name: Jayden Hickey    History of Presenting Illness     Chief Complaint   Patient presents with    Generalized Body Aches       History Provided By: Patient    HPI: Jayden Hickey, 25 y.o. female with a history of ADHD, depression and others as below presents ambulatory to the ED with cc of a couple of days of 8 out of 10 intermittent \"cramping\" of her calves especially at night. 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 She denies any fever. She denies any injuries. She also tells me she has some lower back pain that is worse with movement. She denies abdominal pain and there has been no nausea, vomiting or diarrhea. She denies any urinary symptoms such as dysuria or frequency. There has been no chest pain or shortness of breath. Pain in her back is well localized and does not radiate. She denies any numbness or weakness. She is concerned because she is due back to work this evening and tells me she has to lift and bend to perform her duties. There are no other complaints, changes, or physical findings at this time. PCP: Gwinda Apgar, MD    Current Outpatient Medications   Medication Sig Dispense Refill    ibuprofen (MOTRIN) 600 mg tablet Take 1 Tablet by mouth every six (6) hours as needed for Pain. 20 Tablet 0    cyclobenzaprine (FLEXERIL) 5 mg tablet Take 1 Tablet by mouth three (3) times daily as needed for Muscle Spasm(s). 20 Tablet 0    methylPREDNISolone (Medrol, Sheldon,) 4 mg tablet Take as instructed (Patient not taking: Reported on 2022) 1 Dose Pack 0    pseudoephedrine CR (Sudafed 12 Hour) 120 mg CR tablet Take 1 Tablet by mouth two (2) times daily as needed for Congestion. (Patient not taking: Reported on 2022) 14 Tablet 0    fluticasone propion-salmeteroL (ADVAIR/WIXELA) 250-50 mcg/dose diskus inhaler Take 1 Puff by inhalation two (2) times a day.  (Patient not taking: Reported on 7/11/2022) 1 Inhaler 1    Cetirizine (ZYRTEC) 10 mg cap Take  by mouth. Past History     Past Medical History:  Past Medical History:   Diagnosis Date    ADHD (attention deficit hyperactivity disorder)     Allergic rhinitis     Asthma     Depression     Epilepsy ([facility] [address]     last at age 11    Hyperhidrosis     Non-insulin-dependent diabetes mellitus without complications     reports pre-diabetic    Pre-eclampsia, antepartum, third trimester 2/13/2021    Psychiatric disorder     ADHD    Seizures ([facility] [address]        Past Surgical History:  Past Surgical History:   Procedure Laterality Date    HX OTHER SURGICAL      reports colon surgery \"flushed me out\"       Family History:  Family History   Problem Relation Age of Onset    Seizures Brother     Hypertension Maternal Grandmother     Hypertension Mother     Hypertension Father     Diabetes Neg Hx     Elevated Lipids Neg Hx     Thyroid Disease Neg Hx        Social History:  Social History     Tobacco Use    Smoking status: Never Smoker    Smokeless tobacco: Never Used   Vaping Use    Vaping Use: Never used   Substance Use Topics    Alcohol use: No    Drug use: No       Allergies: Allergies   Allergen Reactions    Codeine Rash    Pcn [Penicillins] Rash     Review of Systems   Review of Systems   Constitutional: Negative for fever. Respiratory: Negative for shortness of breath. Cardiovascular: Negative for chest pain. Gastrointestinal: Negative for abdominal pain, diarrhea, nausea and vomiting. Genitourinary: Negative for dysuria, frequency and urgency. Musculoskeletal: Positive for back pain and myalgias. Neurological: Negative for weakness and numbness. All other systems reviewed and are negative. Physical Exam   Physical Exam  Vitals and nursing note reviewed. Constitutional:       General: She is not in acute distress. Appearance: She is well-developed. She is not toxic-appearing.    HENT:      Head: Normocephalic and atraumatic. Jaw: No trismus. Right Ear: External ear normal.      Left Ear: External ear normal.      Nose: Nose normal.      Mouth/Throat:      Pharynx: Uvula midline. Eyes:      General: No scleral icterus. Conjunctiva/sclera: Conjunctivae normal.      Pupils: Pupils are equal, round, and reactive to light. Cardiovascular:      Rate and Rhythm: Normal rate and regular rhythm. Pulmonary:      Effort: Pulmonary effort is normal. No tachypnea, accessory muscle usage or respiratory distress. Breath sounds: No decreased breath sounds or wheezing. Abdominal:      Palpations: Abdomen is soft. Tenderness: There is no abdominal tenderness. Musculoskeletal:         General: Normal range of motion. Cervical back: Full passive range of motion without pain and normal range of motion. Lumbar back: Tenderness present. Back:       Comments:   LOWER BACK:  No bruising, redness or swelling. No step off. Diffuse muscular discomfort. No CVA tenderness    BILATERAL LOWER EXTREMITIES:  Ambulates with a normal gait  Good symmetry  No lower extremity edema or erythema  No pain in the calves with palpation  No cordlike varicosities of either calf   Skin:     Findings: No rash. Neurological:      Mental Status: She is alert and oriented to person, place, and time. She is not disoriented. GCS: GCS eye subscore is 4. GCS verbal subscore is 5. GCS motor subscore is 6. Cranial Nerves: No cranial nerve deficit. Psychiatric:         Speech: Speech normal.       Diagnostic Study Results     Labs -   No results found for this or any previous visit (from the past 12 hour(s)). Radiologic Studies -   No orders to display     CT Results  (Last 48 hours)    None        CXR Results  (Last 48 hours)    None        Medical Decision Making   I am the first provider for this patient.     I reviewed the vital signs, available nursing notes, past medical history, past surgical history, family history and social history. Vital Signs-Reviewed the patient's vital signs. Patient Vitals for the past 12 hrs:   Temp Pulse Resp BP SpO2   07/11/22 1022 98.5 °F (36.9 °C) 81 16 137/75 98 %       Pulse Oximetry Analysis - 98% on RA    Records Reviewed: Nursing Notes, Old Medical Records, Previous Radiology Studies, Previous Laboratory Studies and JESSA    Provider Notes (Medical Decision Making): Afebrile and well-appearing. Patient presents with mild low back pain and cramps of her legs in the nighttime. There has been no injury. She has a reassuring exam.  Additional testing deferred. Will encourage more adequate hydration. She may want to consider supplementing with a B vitamin for the nighttime leg cramps. We will offer ibuprofen and cyclobenzaprine for her symptoms. Note for work for today and tomorrow was offered. Refer to primary care. ED Course:   Initial assessment performed. The patients presenting problems have been discussed, and they are in agreement with the care plan formulated and outlined with them. I have encouraged them to ask questions as they arise throughout their visit. Disposition:  Discharge    PLAN:  1. Current Discharge Medication List      START taking these medications    Details   ibuprofen (MOTRIN) 600 mg tablet Take 1 Tablet by mouth every six (6) hours as needed for Pain. Qty: 20 Tablet, Refills: 0  Start date: 7/11/2022      cyclobenzaprine (FLEXERIL) 5 mg tablet Take 1 Tablet by mouth three (3) times daily as needed for Muscle Spasm(s). Qty: 20 Tablet, Refills: 0  Start date: 7/11/2022           2. Follow-up Information     Follow up With Specialties Details Why Contact Info    Regis Huffman MD Pediatric Medicine Call  PRIMARY CARE: Call to schedule follow-up [address]  [place] [address]  [phone]          Return to ED if worse     Diagnosis     Clinical Impression:   1. Leg cramps    2.  Acute midline low back pain without sciatica

## 2022-08-03 NOTE — ED PROVIDER NOTES
EMERGENCY DEPARTMENT HISTORY AND PHYSICAL EXAM      Date: 1/6/2021  Patient Name: Cheryle Habermann    History of Presenting Illness     Chief Complaint   Patient presents with    Ear Pain     bilaterally, after irrigating ears     History Provided By: Patient    HPI: Cheryle Habermann, 27 y.o. 33-week pregnant female with medical history significant for ADHD, seizure disorder, asthma, allergic rhinitis, depression who presents via private vehicle to the ED with cc of acute moderate aching bilateral ear pain and fullness X 1 week. Home irrigation without relief. Denies fever, chills, nausea, vomiting, chest pain, shortness of breath, ear drainage, hearing loss, neck pain or stiffness, headaches, lightheadedness, dizziness. No other medications or modifying factors. PCP: Davion Beard MD    There are no other complaints, changes, or physical findings at this time. No current facility-administered medications on file prior to encounter. Current Outpatient Medications on File Prior to Encounter   Medication Sig Dispense Refill    docusate sodium (Colace) 100 mg capsule Take 100 mg by mouth two (2) times a day.  PNV PG.49/DOGDZXR fum/folic ac (PRENATAL PO) Take  by mouth.  Lisdexamfetamine (Vyvanse) 70 mg cap Vyvanse 70 mg capsule      ondansetron (ZOFRAN ODT) 4 mg disintegrating tablet ondansetron 4 mg disintegrating tablet      fluticasone propionate (FLONASE) 50 mcg/actuation nasal spray fluticasone propionate 50 mcg/actuation nasal spray,suspension      fluticasone propion-salmeteroL (Advair Diskus) 250-50 mcg/dose diskus inhaler Advair Diskus 250 mcg-50 mcg/dose powder for inhalation      doxylamine-pyridoxine, vit B6, (Diclegis) 10-10 mg TbEC DR tablet Diclegis 10 mg-10 mg tablet,delayed release   Take #2 in the am and Take #2 in the pm      montelukast (SINGULAIR) 10 mg tablet Take 1 Tab by mouth daily. 30 Tab 0    budesonide/formoterol fumarate (SYMBICORT IN) Take  by inhalation. Suspicion for Herpes Zoster opthalmicus/encephalitis  Cetirizine (ZYRTEC) 10 mg cap Take  by mouth.  [DISCONTINUED] neomycin-polymyxin-hydrocortisone, buffered, (PEDIOTIC) 3.5-10,000-1 mg/mL-unit/mL-% otic suspension Administer 3 Drops in left ear four (4) times daily. 8 mL 0    polyethylene glycol (MIRALAX) 17 gram packet Take 17 g by mouth daily.  desvenlafaxine succinate (PRISTIQ) 50 mg ER tablet Take 50 mg by mouth daily.  sodium chloride (NASAL SPRAY, SODIUM CHLORIDE,) 0.65 % nasal squeeze bottle 0.05 mL by Both Nostrils route as needed for Congestion. Indications: Nasal Congestion 30 mL 0    glycopyrrolate (ROBINUL) 1 mg tablet Take 1 mg by mouth daily. 2    DULoxetine (CYMBALTA) 30 mg capsule Take 30 mg by mouth daily. 3    OTHER Once every 2 weeks. Allergy shots      oxcarbazepine (TRILEPTAL) 150 mg tablet 900 mg two (2) times a day. Past History     Past Medical History:  Past Medical History:   Diagnosis Date    ADHD (attention deficit hyperactivity disorder)     Allergic rhinitis     Asthma     Depression     Hyperhidrosis     Non-insulin-dependent diabetes mellitus without complications     Psychiatric disorder     ADHD    Seizures ([facility] [address]      Past Surgical History:  History reviewed. No pertinent surgical history. Family History:  Family History   Problem Relation Age of Onset    Seizures Brother     Hypertension Maternal Grandmother     Hypertension Mother     Hypertension Father     Diabetes Neg Hx     Elevated Lipids Neg Hx     Thyroid Disease Neg Hx      Social History:  Social History     Tobacco Use    Smoking status: Never Smoker    Smokeless tobacco: Never Used   Substance Use Topics    Alcohol use: No    Drug use: No     Allergies: Allergies   Allergen Reactions    Azithromycin Rash    Codeine Rash    Pcn [Penicillins] Rash     Review of Systems   Review of Systems   Constitutional: Negative for activity change, chills, fatigue and fever. HENT: Positive for ear pain.  Negative for congestion, dental problem, drooling, ear discharge, facial swelling, hearing loss, mouth sores, postnasal drip, rhinorrhea, sinus pressure, sore throat, tinnitus and trouble swallowing. Eyes: Negative for pain, discharge and visual disturbance. Respiratory: Negative for apnea, cough and shortness of breath. Cardiovascular: Negative for chest pain. Gastrointestinal: Negative for abdominal pain, diarrhea, nausea and vomiting. Genitourinary: Negative. Negative for dysuria and frequency. Musculoskeletal: Negative for arthralgias and neck pain. Skin: Negative. Negative for rash. Neurological: Negative for facial asymmetry and headaches. Psychiatric/Behavioral: Negative. Physical Exam   Physical Exam  Vitals signs and nursing note reviewed. Constitutional:       General: She is not in acute distress. Appearance: Normal appearance. She is well-developed. She is not ill-appearing, toxic-appearing or diaphoretic. HENT:      Head: Normocephalic and atraumatic. Jaw: There is normal jaw occlusion. Right Ear: Hearing and external ear normal. Swelling present. No laceration, drainage or tenderness. No middle ear effusion. No foreign body. No mastoid tenderness. No hemotympanum. Tympanic membrane is not injected, scarred, perforated, erythematous, retracted or bulging. Left Ear: Hearing and external ear normal. Swelling present. No laceration, drainage or tenderness. No middle ear effusion. No foreign body. No mastoid tenderness. No hemotympanum. Tympanic membrane is not injected, scarred, perforated, erythematous, retracted or bulging. Ears:      Comments: Moderate amount of cerumen in bilateral ear canals. Nose: Nose normal. No mucosal edema, congestion or rhinorrhea. Right Sinus: No maxillary sinus tenderness or frontal sinus tenderness. Left Sinus: No maxillary sinus tenderness or frontal sinus tenderness. Mouth/Throat:      Lips: Pink.       Mouth: Mucous membranes are moist.      Pharynx: Oropharynx is clear. Uvula midline. Eyes:      Conjunctiva/sclera: Conjunctivae normal.      Pupils: Pupils are equal, round, and reactive to light. Neck:      Musculoskeletal: Normal range of motion. Pulmonary:      Effort: Pulmonary effort is normal. No respiratory distress. Abdominal:      Comments: Gravid abd. Musculoskeletal: Normal range of motion. Skin:     General: Skin is warm and dry. Neurological:      Mental Status: She is alert and oriented to person, place, and time. Psychiatric:         Behavior: Behavior normal.         Thought Content: Thought content normal.         Judgment: Judgment normal.       Diagnostic Study Results   Labs -   No results found for this or any previous visit (from the past 12 hour(s)). Radiologic Studies -   No orders to display     No results found. Medical Decision Making   I am the first provider for this patient. I reviewed the vital signs, available nursing notes, past medical history, past surgical history, family history and social history. Vital Signs-Reviewed the patient's vital signs. Patient Vitals for the past 24 hrs:   Temp Pulse Resp BP SpO2   01/06/21 1734 98.4 °F (36.9 °C) 94 18 116/61 100 %     Pulse Oximetry Analysis - 100% on RA (normal)    Records Reviewed: Nursing Notes, Old Medical Records, Previous Radiology Studies and Previous Laboratory Studies    Provider Notes (Medical Decision Making):   27year-old 29-week pregnant female presents with bilateral ear pain X 1 week. Additionally endorses increased cerumen. Differential includes impaction, foreign body, otitis media, otitis externa. No signs of mastoiditis or vertigo. Suspect otitis externa and increased cerumen. Will treat with topical antibiotics after irrigation and close follow-up. ED Course:   Initial assessment performed.  The patients presenting problems have been discussed, and they are in agreement with the care plan formulated and outlined with them. I have encouraged them to ask questions as they arise throughout their visit. Procedure Note - Cerumen Removal:   6:43 PM  Performed by: RN under TREVON Davies supervision  Pt's bilateral ear was irrigated with water. Cerumen successfully removed using irrigaiton. The procedure took 1-15 minutes, and pt tolerated well. Progress Note:   Updated pt on all returned results and findings. Discussed the importance of proper follow up as referred below along with return precautions. Pt in agreement with the care plan and expresses agreement with and understanding of all items discussed. Disposition:  6:43 PM  I have discussed with patient their diagnosis, treatment, and follow up plan. The patient agrees to follow up as outlined in discharge paperwork and also to return to the ED with any worsening. Lis Thorne PA-C      PLAN:  1. Current Discharge Medication List      START taking these medications    Details   neomycin-polymyxin-hydrocortisone (CORTISPORIN) otic solution Administer 5 Drops into each ear four (4) times daily for 7 days. Qty: 10 mL, Refills: 0         STOP taking these medications       neomycin-polymyxin-hydrocortisone, buffered, (PEDIOTIC) 3.5-10,000-1 mg/mL-unit/mL-% otic suspension Comments:   Reason for Stoppin.   Follow-up Information     Follow up With Specialties Details Why Contact Info    Clement Bajwa MD Pediatric Medicine Schedule an appointment as soon as possible for a visit  As needed, If symptoms worsen [address] [place] [address]  [phone]          Return to ED if worse     Diagnosis     Clinical Impression:   1. Bilateral impacted cerumen    2. Acute otitis externa of both ears, unspecified type            Please note that this dictation was completed with Dragon, computer voice recognition software.   Quite often unanticipated grammatical, syntax, homophones, and other interpretive errors are inadvertently transcribed by the computer software. Please disregard these errors. Additionally, please excuse any errors that have escaped final proofreading.

## 2022-08-25 ENCOUNTER — HOSPITAL ENCOUNTER (EMERGENCY)
Age: 22
Discharge: HOME | End: 2022-08-25
Attending: EMERGENCY MEDICINE
Payer: COMMERCIAL

## 2022-08-25 VITALS
BODY MASS INDEX: 34.23 KG/M2 | WEIGHT: 186 LBS | HEIGHT: 62 IN | DIASTOLIC BLOOD PRESSURE: 81 MMHG | HEART RATE: 94 BPM | OXYGEN SATURATION: 96 % | TEMPERATURE: 99 F | RESPIRATION RATE: 16 BRPM | SYSTOLIC BLOOD PRESSURE: 128 MMHG

## 2022-08-25 DIAGNOSIS — L30.9 DERMATITIS: Primary | ICD-10-CM

## 2022-08-25 LAB
APPEARANCE UR: CLEAR
BACTERIA URNS QL MICRO: NEGATIVE /HPF
BILIRUB UR QL: NEGATIVE
C TRACH DNA SPEC QL NAA+PROBE: NEGATIVE
CLUE CELLS VAG QL WET PREP: NORMAL
COLOR UR: ABNORMAL
EPITH CASTS URNS QL MICRO: ABNORMAL /LPF
GLUCOSE UR STRIP.AUTO-MCNC: NEGATIVE MG/DL
HCG UR QL: NEGATIVE
HGB UR QL STRIP: NEGATIVE
KETONES UR QL STRIP.AUTO: NEGATIVE MG/DL
KOH PREP SPEC: NORMAL
LEUKOCYTE ESTERASE UR QL STRIP.AUTO: ABNORMAL
N GONORRHOEA DNA SPEC QL NAA+PROBE: NEGATIVE
NITRITE UR QL STRIP.AUTO: NEGATIVE
PH UR STRIP: 8 [PH] (ref 5–8)
PROT UR STRIP-MCNC: NEGATIVE MG/DL
RBC #/AREA URNS HPF: ABNORMAL /HPF (ref 0–5)
SAMPLE TYPE: NORMAL
SERVICE CMNT-IMP: NORMAL
SERVICE CMNT-IMP: NORMAL
SP GR UR REFRACTOMETRY: 1.02
SPECIMEN SOURCE: NORMAL
T VAGINALIS VAG QL WET PREP: NORMAL
UA: UC IF INDICATED,UAUC: ABNORMAL
UROBILINOGEN UR QL STRIP.AUTO: 0.2 EU/DL (ref 0.2–1)
WBC URNS QL MICRO: ABNORMAL /HPF (ref 0–4)

## 2022-08-25 PROCEDURE — 96372 THER/PROPH/DIAG INJ SC/IM: CPT

## 2022-08-25 PROCEDURE — 81001 URINALYSIS AUTO W/SCOPE: CPT

## 2022-08-25 PROCEDURE — 74011250636 HC RX REV CODE- 250/636: Performed by: NURSE PRACTITIONER

## 2022-08-25 PROCEDURE — 87210 SMEAR WET MOUNT SALINE/INK: CPT

## 2022-08-25 PROCEDURE — 87491 CHLMYD TRACH DNA AMP PROBE: CPT

## 2022-08-25 PROCEDURE — 99284 EMERGENCY DEPT VISIT MOD MDM: CPT

## 2022-08-25 PROCEDURE — 81025 URINE PREGNANCY TEST: CPT

## 2022-08-25 RX ORDER — CETIRIZINE HCL 10 MG
10 TABLET ORAL DAILY
Qty: 7 TABLET | Refills: 0 | Status: SHIPPED | OUTPATIENT
Start: 2022-08-25

## 2022-08-25 RX ORDER — METHYLPREDNISOLONE 4 MG/1
TABLET ORAL
Qty: 1 DOSE PACK | Refills: 0 | Status: SHIPPED | OUTPATIENT
Start: 2022-08-25

## 2022-08-25 RX ORDER — DEXAMETHASONE SODIUM PHOSPHATE 100 MG/10ML
10 INJECTION INTRAMUSCULAR; INTRAVENOUS
Status: COMPLETED | OUTPATIENT
Start: 2022-08-25 | End: 2022-08-25

## 2022-08-25 RX ADMIN — DEXAMETHASONE SODIUM PHOSPHATE 10 MG: 10 INJECTION INTRAMUSCULAR; INTRAVENOUS at 13:32

## 2022-08-25 NOTE — ED NOTES
Pt given dc instructions and education. Pt verbalized understanding and ambulated w/ steady gait out of the ER.

## 2022-08-25 NOTE — ED PROVIDER NOTES
EMERGENCY DEPARTMENT HISTORY AND PHYSICAL EXAM          Date: 8/25/2022  Patient Name: Penny Bonner    History of Presenting Illness     Chief Complaint   Patient presents with    Itching         History Provided By: Patient      HPI: Penny Bonner is a 23 y.o. female with a PMH of  ADHD, Asthma, Seizures, Depression,   who presents with itching. Onset \" a few weeks\"  ago. Located to the breast, genital region and vaginal. Reports changes in soap stating she has tried 3 different soaps. Denies vaginal d/c, odor, dysuria or urinary frequency. Denies hx of eczema or psoriasis. She has not tried anything for her sx     PCP: Jorge Pinzon MD    Current Outpatient Medications   Medication Sig Dispense Refill    cetirizine (ZYRTEC) 10 mg tablet Take 1 Tablet by mouth daily. 7 Tablet 0    methylPREDNISolone (Medrol, Sheldon,) 4 mg tablet Use as directed 1 Dose Pack 0    pseudoephedrine CR (Sudafed 12 Hour) 120 mg CR tablet Take 1 Tablet by mouth two (2) times daily as needed for Congestion. (Patient not taking: Reported on 7/11/2022) 14 Tablet 0    fluticasone propion-salmeteroL (ADVAIR/WIXELA) 250-50 mcg/dose diskus inhaler Take 1 Puff by inhalation two (2) times a day.  (Patient not taking: Reported on 7/11/2022) 1 Inhaler 1       Past History     Past Medical History:  Past Medical History:   Diagnosis Date    ADHD (attention deficit hyperactivity disorder)     Allergic rhinitis     Asthma     Depression     Epilepsy ([facility] [address]     last at age 11    Hyperhidrosis     Non-insulin-dependent diabetes mellitus without complications     reports pre-diabetic    Pre-eclampsia, antepartum, third trimester 2/13/2021    Psychiatric disorder     ADHD    Seizures ([address]        Past Surgical History:  Past Surgical History:   Procedure Laterality Date    HX OTHER SURGICAL      reports colon surgery \"flushed me out\"       Family History:  Family History   Problem Relation Age of Onset    Seizures Brother     Hypertension Maternal Grandmother Hypertension Mother     Hypertension Father     Diabetes Neg Hx     Elevated Lipids Neg Hx     Thyroid Disease Neg Hx        Social History:  Social History     Tobacco Use    Smoking status: Never    Smokeless tobacco: Never   Vaping Use    Vaping Use: Never used   Substance Use Topics    Alcohol use: No    Drug use: No       Allergies: Allergies   Allergen Reactions    Codeine Rash    Pcn [Penicillins] Rash         Review of Systems   Review of Systems   Constitutional:  Negative for appetite change, chills, fatigue and fever. HENT:  Negative for congestion, ear pain and rhinorrhea. Eyes:  Negative for pain and itching. Respiratory:  Negative for cough, chest tightness, shortness of breath and wheezing. Cardiovascular:  Negative for chest pain, palpitations and leg swelling. Gastrointestinal:  Negative for abdominal pain, nausea and vomiting. Genitourinary:  Negative for dysuria, frequency, urgency, vaginal bleeding, vaginal discharge and vaginal pain.        + vaginal itching    Musculoskeletal:  Negative for arthralgias, back pain and joint swelling. Skin:  Negative for color change, rash and wound. + itching   Neurological:  Negative for dizziness, numbness and headaches. All other systems reviewed and are negative. Physical Exam     Vitals:    08/25/22 1217 08/25/22 1327   BP: 128/81    Pulse: 94    Resp: 16    Temp: 99 °F (37.2 °C)    SpO2: 96% 96%   Weight: 84.4 kg (186 lb)    Height: 5' 2\" (1.575 m)      Physical Exam  Vitals and nursing note reviewed. Constitutional:       General: She is not in acute distress. Appearance: She is well-developed. She is not ill-appearing. Cardiovascular:      Rate and Rhythm: Normal rate and regular rhythm. Pulses: Normal pulses. Heart sounds: Normal heart sounds. Pulmonary:      Effort: Pulmonary effort is normal.      Breath sounds: Normal breath sounds.    Abdominal:      General: Bowel sounds are normal. Palpations: Abdomen is soft. Tenderness: There is no abdominal tenderness. There is no guarding. Skin:     General: Skin is warm and dry. Comments: Erythema and excoriate inferior L breast   Excoriation to the R groin    Neurological:      Mental Status: She is alert and oriented to person, place, and time. Diagnostic Study Results     Labs -     Recent Results (from the past 12 hour(s))   WET PREP    Collection Time: 08/25/22  1:17 PM    Specimen: Miscellaneous sample   Result Value Ref Range    Clue cells CLUE CELLS ABSENT      Wet prep NO TRICHOMONAS SEEN     KOH, OTHER SOURCES    Collection Time: 08/25/22  1:17 PM    Specimen: Vagina; Other   Result Value Ref Range    Special Requests: NO SPECIAL REQUESTS      KOH NO YEAST SEEN     URINALYSIS W/ REFLEX CULTURE    Collection Time: 08/25/22  1:17 PM    Specimen: Urine   Result Value Ref Range    Color YELLOW/STRAW      Appearance CLEAR CLEAR      Specific gravity 1.020      pH (UA) 8.0 5.0 - 8.0      Protein Negative NEG mg/dL    Glucose Negative NEG mg/dL    Ketone Negative NEG mg/dL    Bilirubin Negative NEG      Blood Negative NEG      Urobilinogen 0.2 0.2 - 1.0 EU/dL    Nitrites Negative NEG      Leukocyte Esterase SMALL (A) NEG      WBC 0-4 0 - 4 /hpf    RBC 0-5 0 - 5 /hpf    Epithelial cells FEW FEW /lpf    Bacteria Negative NEG /hpf    UA:UC IF INDICATED CULTURE NOT INDICATED BY UA RESULT CNI     HCG URINE, QL. - POC    Collection Time: 08/25/22  1:23 PM   Result Value Ref Range    Pregnancy test,urine (POC) Negative NEG         Radiologic Studies -   No orders to display     CT Results  (Last 48 hours)      None          CXR Results  (Last 48 hours)      None              Medical Decision Making   I am the first provider for this patient. I reviewed the vital signs, available nursing notes, past medical history, past surgical history, family history and social history. Vital Signs-Reviewed the patient's vital signs.     Records Reviewed: Nursing Notes and Old Medical Records    Provider Notes (Medical Decision Making):     ED COURSE:   Initial assessment performed. The patients presenting problems have been discussed, and they are in agreement with the care plan formulated and outlined with them. I have encouraged them to ask questions as they arise throughout their visit. 22 yo F presents with itching exhibiting excoriations and erythema to the L inferior breast and R inguinal region. Plan to obtain genital swabs to r/o BV vs candidiasis due to vaginal itching. She has no concerned for STD and declines empiric treatment. DDX: Chlamydia, Gonorrhea, BV, Candidiasis, Trichomonas, UTI, irritant dermatitis           Disposition:  Discharge    DISCHARGE NOTE:         Care plan outlined and precautions discussed. Patient has no new complaints, changes, or physical findings. All of pt's questions and concerns were addressed. Patient was instructed and agrees to follow up with PCP, as well as to return to the ED upon further deterioration. Patient is ready to go home. Follow-up Information       Follow up With Specialties Details Why Contact Info    Loli Alcaraz MD Pediatric Medicine Schedule an appointment as soon as possible for a visit  As needed, If symptoms worsen [address] [place] [address]  [phone]              Discharge Medication List as of 8/25/2022  1:49 PM        START taking these medications    Details   cetirizine (ZYRTEC) 10 mg tablet Take 1 Tablet by mouth daily. , Normal, Disp-7 Tablet, R-0           CONTINUE these medications which have CHANGED    Details   methylPREDNISolone (Medrol, Sheldon,) 4 mg tablet Use as directed, Normal, Disp-1 Dose Pack, R-0           CONTINUE these medications which have NOT CHANGED    Details   pseudoephedrine CR (Sudafed 12 Hour) 120 mg CR tablet Take 1 Tablet by mouth two (2) times daily as needed for Congestion. , Normal, Disp-14 Tablet, R-0      fluticasone propion-salmeteroL (ADVAIR/WIXELA) 250-50 mcg/dose diskus inhaler Take 1 Puff by inhalation two (2) times a day., Normal, Disp-1 Inhaler, R-1           STOP taking these medications       ibuprofen (MOTRIN) 600 mg tablet Comments:   Reason for Stopping:         cyclobenzaprine (FLEXERIL) 5 mg tablet Comments:   Reason for Stopping:         Cetirizine (ZYRTEC) 10 mg cap Comments:   Reason for Stopping:               Procedures:  Procedures    Please note that this dictation was completed with Dragon, computer voice recognition software. Quite often unanticipated grammatical, syntax, homophones, and other interpretive errors are inadvertently transcribed by the computer software. Please disregard these errors. Additionally, please excuse any errors that have escaped final proofreading. Diagnosis     Clinical Impression:   1.  Dermatitis

## 2022-08-25 NOTE — DISCHARGE INSTRUCTIONS
It was a pleasure taking care of you at [facility] Emergency Department today. We know that when you come to [facility], you are entrusting us with your health, comfort, and safety. Our physicians and nurses honor that trust, and we truly appreciate the opportunity to care for you and your loved ones. We also value our feedback. If you receive a survey about your Emergency Department experience today, please fill it out. We care about our patients' feedback, and we listen to what you have to say. Thank you!

## 2022-08-25 NOTE — ED TRIAGE NOTES
C/o itching \"all over\" for a few weeks. Pt states most itching in her genital region. Taking zyrtec otc without relief. States this started when she changed her soap.

## 2022-12-01 ENCOUNTER — HOSPITAL ENCOUNTER (EMERGENCY)
Age: 22
Discharge: HOME | End: 2022-12-01
Attending: EMERGENCY MEDICINE
Payer: COMMERCIAL

## 2022-12-01 VITALS
HEIGHT: 62 IN | SYSTOLIC BLOOD PRESSURE: 132 MMHG | OXYGEN SATURATION: 100 % | WEIGHT: 180 LBS | HEART RATE: 73 BPM | BODY MASS INDEX: 33.13 KG/M2 | DIASTOLIC BLOOD PRESSURE: 75 MMHG | TEMPERATURE: 98 F | RESPIRATION RATE: 16 BRPM

## 2022-12-01 DIAGNOSIS — Z20.822 PERSON UNDER INVESTIGATION FOR COVID-19: ICD-10-CM

## 2022-12-01 DIAGNOSIS — J06.9 ACUTE URI: Primary | ICD-10-CM

## 2022-12-01 LAB
FLUAV AG NPH QL IA: NEGATIVE
FLUBV AG NOSE QL IA: NEGATIVE

## 2022-12-01 PROCEDURE — U0005 INFEC AGEN DETEC AMPLI PROBE: HCPCS

## 2022-12-01 PROCEDURE — 99283 EMERGENCY DEPT VISIT LOW MDM: CPT

## 2022-12-01 PROCEDURE — 87804 INFLUENZA ASSAY W/OPTIC: CPT

## 2022-12-01 RX ORDER — METHYLPREDNISOLONE 4 MG/1
TABLET ORAL
Qty: 1 DOSE PACK | Refills: 0 | Status: SHIPPED | OUTPATIENT
Start: 2022-12-01

## 2022-12-01 RX ORDER — PSEUDOEPHEDRINE HCL 120 MG/1
120 TABLET, FILM COATED, EXTENDED RELEASE ORAL
Qty: 14 TABLET | Refills: 0 | Status: SHIPPED | OUTPATIENT
Start: 2022-12-01

## 2022-12-01 NOTE — ED TRIAGE NOTES
Pt arrives to the ED AAOX4 with a c/c of sore throat and nasal congestion onset 1 week ago. Pt additionally reports posterior head pain since yesterday after she fell off her bed, denies LOC. Pt is noted in stable condition and in no acute distress.

## 2022-12-01 NOTE — ED NOTES
Patient (s) given copy of dc instructions and electronic scripts. Patient (s)  verbalized understanding of instructions and script (s). Patient given a current medication reconciliation form and verbalized understanding of their medications. Patient (s) verbalized understanding of the importance of discussing medications with  his or her physician or clinic they will be following up with. Patient alert and oriented and in no acute distress. Patient offered wheelchair from treatment area to hospital entrance, patient declined wheelchair.

## 2022-12-01 NOTE — ED NOTES
Emergency Department Nursing Plan of Care       The Nursing Plan of Care is developed from the Nursing assessment and Emergency Department Attending provider initial evaluation. The plan of care may be reviewed in the ED Provider note.     The Plan of Care was developed with the following considerations:   Patient / Family readiness to learn indicated by:verbalized understanding, successful return demonstration and appropriate questions asked  Persons(s) to be included in education: patient  Barriers to Learning/Limitations:No    Signed     Ravi Marley RN    12/1/2022   5:02 PM

## 2022-12-01 NOTE — ED PROVIDER NOTES
EMERGENCY DEPARTMENT HISTORY AND PHYSICAL EXAM          Date: 12/1/2022  Patient Name: Chris Nath    History of Presenting Illness     Chief Complaint   Patient presents with    Cold Symptoms         History Provided By: Patient      HPI: Chris Nath is a 23 y.o. female with a PMH of  ADHD, seizure, asthma, allergic rhinitis, depression  who presents with cold symptoms. Onset 1 week ago. Patient reports sore throat in addition to nasal congestion. She also reports headache but states that secondary to falling off her bed while sleeping. Denies nausea, vomiting, dizziness. She also denies fever, chills. Patient has not tried anything for symptoms. She is requesting for influenza and COVID testing. PCP: Destiney Mills MD    Current Outpatient Medications   Medication Sig Dispense Refill    pseudoephedrine CR (Sudafed 12 Hour) 120 mg CR tablet Take 1 Tablet by mouth two (2) times daily as needed for Congestion. 14 Tablet 0    methylPREDNISolone (Medrol, Sheldon,) 4 mg tablet Use as directed 1 Dose Pack 0    cetirizine (ZYRTEC) 10 mg tablet Take 1 Tablet by mouth daily. (Patient not taking: Reported on 12/1/2022) 7 Tablet 0    fluticasone propion-salmeteroL (ADVAIR/WIXELA) 250-50 mcg/dose diskus inhaler Take 1 Puff by inhalation two (2) times a day.  (Patient not taking: No sig reported) 1 Inhaler 1       Past History     Past Medical History:  Past Medical History:   Diagnosis Date    ADHD (attention deficit hyperactivity disorder)     Allergic rhinitis     Asthma     Depression     Epilepsy ([facility] [address]     last at age 11    Hyperhidrosis     Non-insulin-dependent diabetes mellitus without complications     reports pre-diabetic    Pre-eclampsia, antepartum, third trimester 2/13/2021    Psychiatric disorder     ADHD    Seizures ([facility] [address]        Past Surgical History:  Past Surgical History:   Procedure Laterality Date    HX OTHER SURGICAL      reports colon surgery \"flushed me out\"       Family History:  Family History Problem Relation Age of Onset    Seizures Brother     Hypertension Maternal Grandmother     Hypertension Mother     Hypertension Father     Diabetes Neg Hx     Elevated Lipids Neg Hx     Thyroid Disease Neg Hx        Social History:  Social History     Tobacco Use    Smoking status: Never    Smokeless tobacco: Never   Vaping Use    Vaping Use: Never used   Substance Use Topics    Alcohol use: No    Drug use: No       Allergies: Allergies   Allergen Reactions    Codeine Rash    Pcn [Penicillins] Rash         Review of Systems   Review of Systems   Constitutional:  Negative for appetite change, chills, fatigue and fever. HENT:  Positive for congestion and sore throat. Negative for ear pain and rhinorrhea. Eyes:  Negative for pain and itching. Respiratory:  Positive for cough. Negative for chest tightness, shortness of breath and wheezing. Cardiovascular:  Negative for chest pain, palpitations and leg swelling. Gastrointestinal:  Negative for abdominal pain, nausea and vomiting. Genitourinary:  Negative for dysuria, frequency and urgency. Musculoskeletal:  Negative for arthralgias, back pain and joint swelling. Skin:  Negative for color change and rash. Neurological:  Negative for dizziness, weakness, numbness and headaches. All other systems reviewed and are negative. Physical Exam     Vitals:    12/01/22 1610   BP: 132/75   Pulse: 73   Resp: 16   Temp: 98 °F (36.7 °C)   SpO2: 100%   Weight: 81.6 kg (180 lb)   Height: 5' 2\" (1.575 m)     Physical Exam  Vitals and nursing note reviewed. Constitutional:       General: She is not in acute distress. Appearance: She is well-developed. She is not ill-appearing. HENT:      Head: Normocephalic and atraumatic. Right Ear: Tympanic membrane and ear canal normal.      Left Ear: Tympanic membrane and ear canal normal.      Nose: Nose normal.      Mouth/Throat:      Mouth: Mucous membranes are moist.      Pharynx: Oropharynx is clear. Posterior oropharyngeal erythema present. No oropharyngeal exudate. Eyes:      Extraocular Movements: Extraocular movements intact. Conjunctiva/sclera: Conjunctivae normal.      Pupils: Pupils are equal, round, and reactive to light. Cardiovascular:      Rate and Rhythm: Normal rate and regular rhythm. Pulses: Normal pulses. Heart sounds: Normal heart sounds. Pulmonary:      Effort: Pulmonary effort is normal.      Breath sounds: Normal breath sounds. Abdominal:      General: Bowel sounds are normal.      Palpations: Abdomen is soft. Tenderness: There is no abdominal tenderness. There is no right CVA tenderness, left CVA tenderness or guarding. Musculoskeletal:      Cervical back: Normal range of motion and neck supple. Skin:     General: Skin is warm and dry. Neurological:      Mental Status: She is alert and oriented to person, place, and time. GCS: GCS eye subscore is 4. GCS verbal subscore is 5. GCS motor subscore is 6. Medical Decision Making   I am the first provider for this patient. I reviewed the vital signs, available nursing notes, past medical history, past surgical history, family history and social history. Vital Signs-Reviewed the patient's vital signs. Records Reviewed: Nursing Notes and Old Medical Records    Provider Notes (Medical Decision Making):   22 with sore throat and nasal congestion exhibiting erythematous pharynx but otherwise benign physical exam.  Discussed with patient since symptoms have been approximately 1 week that influenza testing is not recommended however she is adamant she would like testing. Advised that even if test is positive she is out of the window for Tamiflu but patient verbalized understanding. Plan to treat symptoms with Zyrtec, Medrol Dosepak and Sudafed.     DDX: COVID 19, URI, Bronchitis, Influenza              Procedures:  Procedures    Diagnostic Study Results     Labs -     Recent Results (from the past 12 hour(s))   INFLUENZA A+B VIRAL AGS    Collection Time: 12/01/22  5:26 PM   Result Value Ref Range    Influenza A Antigen Negative NEG      Influenza B Antigen Negative NEG         Radiologic Studies -   No orders to display     CT Results  (Last 48 hours)      None          CXR Results  (Last 48 hours)      None                Disposition:  Discharge     DISCHARGE NOTE:       Care plan outlined and precautions discussed. Patient has no new complaints, changes, or physical findings. All of pt's questions and concerns were addressed. Patient was instructed and agrees to follow up with PCP, as well as to return to the ED upon further deterioration. Patient is ready to go home. Follow-up Information       Follow up With Specialties Details Why Contact Info    Yogesh Rowland MD Pediatric Medicine Call in 1 week As needed, If symptoms worsen [address] [facility]  [address] [facility]  [phone]              Current Discharge Medication List        CONTINUE these medications which have CHANGED    Details   pseudoephedrine CR (Sudafed 12 Hour) 120 mg CR tablet Take 1 Tablet by mouth two (2) times daily as needed for Congestion. Qty: 14 Tablet, Refills: 0  Start date: 12/1/2022      methylPREDNISolone (Medrol, Sheldon,) 4 mg tablet Use as directed  Qty: 1 Dose Pack, Refills: 0  Start date: 12/1/2022               Please note that this dictation was completed with Dragon, computer voice recognition software. Quite often unanticipated grammatical, syntax, homophones, and other interpretive errors are inadvertently transcribed by the computer software. Please disregard these errors. Additionally, please excuse any errors that have escaped final proofreading. Diagnosis     Clinical Impression:   1. Acute URI    2.  Person under investigation for COVID-19

## 2022-12-01 NOTE — DISCHARGE INSTRUCTIONS
It was a pleasure taking care of you at [facility] Emergency Department today. We know that when you come to [facility], you are entrusting us with your health, comfort, and safety. Our physicians and nurses honor that trust, and we truly appreciate the opportunity to care for you and your loved ones. We also value our feedback. If you receive a survey about your Emergency Department experience today, please fill it out. We care about our patients' feedback, and we listen to what you have to say. Thank you!

## 2022-12-01 NOTE — Clinical Note
[address] EMERGENCY DEPT  [address] [facility] [address] [phone]    Work/School Note    Date: 12/1/2022    To Whom It May concern:    Serjio Pedraza was seen and treated today in the emergency room by the following provider(s):  Attending Provider: Kianna Diaz MD  Nurse Practitioner: Meli Willett NP. Serjio Pedraza is excused from work/school on 12/01/22 and 12/02/22. She is medically clear to return to work/school on 12/3/2022.        Sincerely,          Ruth Baird NP

## 2022-12-02 LAB
SARS-COV-2, XPLCVT: NOT DETECTED
SOURCE, COVRS: NORMAL

## 2023-05-22 ENCOUNTER — APPOINTMENT (OUTPATIENT)
Facility: HOSPITAL | Age: 23
End: 2023-05-22
Payer: COMMERCIAL

## 2023-05-22 ENCOUNTER — HOSPITAL ENCOUNTER (EMERGENCY)
Facility: HOSPITAL | Age: 23
Discharge: HOME | End: 2023-05-22
Payer: COMMERCIAL

## 2023-05-22 VITALS
RESPIRATION RATE: 19 BRPM | SYSTOLIC BLOOD PRESSURE: 132 MMHG | DIASTOLIC BLOOD PRESSURE: 73 MMHG | BODY MASS INDEX: 33.86 KG/M2 | TEMPERATURE: 98.2 F | HEIGHT: 62 IN | OXYGEN SATURATION: 97 % | HEART RATE: 80 BPM | WEIGHT: 184 LBS

## 2023-05-22 DIAGNOSIS — S93.402A SPRAIN OF LEFT ANKLE, UNSPECIFIED LIGAMENT, INITIAL ENCOUNTER: Primary | ICD-10-CM

## 2023-05-22 PROCEDURE — 73610 X-RAY EXAM OF ANKLE: CPT

## 2023-05-22 PROCEDURE — 99283 EMERGENCY DEPT VISIT LOW MDM: CPT

## 2023-05-22 RX ORDER — IBUPROFEN 600 MG/1
600 TABLET ORAL EVERY 6 HOURS PRN
Qty: 20 TABLET | Refills: 0 | Status: SHIPPED | OUTPATIENT
Start: 2023-05-22

## 2023-05-22 ASSESSMENT — PAIN - FUNCTIONAL ASSESSMENT: PAIN_FUNCTIONAL_ASSESSMENT: 0-10

## 2023-05-22 ASSESSMENT — PAIN DESCRIPTION - FREQUENCY: FREQUENCY: CONTINUOUS

## 2023-05-22 ASSESSMENT — PAIN DESCRIPTION - ORIENTATION: ORIENTATION: LEFT

## 2023-05-22 ASSESSMENT — PAIN DESCRIPTION - DESCRIPTORS: DESCRIPTORS: ACHING

## 2023-05-22 ASSESSMENT — PAIN DESCRIPTION - LOCATION: LOCATION: ANKLE

## 2023-05-22 ASSESSMENT — PAIN DESCRIPTION - PAIN TYPE: TYPE: ACUTE PAIN

## 2023-05-22 NOTE — ED TRIAGE NOTES
Pt reports tripping and falling yesterday over a curb fell straight forward onto the concert. Pt reports painful movements. 6/10 pain. No deformity noted. Neuro-vascularity intact. Ambulatory on arrival with a steady gait unassisted.

## 2023-05-22 NOTE — ED NOTES
Pt's left ankle ace wrapped per order. Pt given dc instructions education. 1 prescription sent over to preferred pharmacy. Pt verbalized understanding and ambulated out of ER w/ steady gait.      Vikki Salcedo RN  05/22/23 2988

## 2023-05-22 NOTE — ED PROVIDER NOTES
Received a staff message o offer the pt an appt for NV to go over H Pylori testing an for the pt to be given the specimen collection supplies. After chart review the pt has a NV appt already scheduled for today at 2pm. Closing the Telephone encounter and staff message.   Joi Peters RN [facility] EMERGENCY DEPT  EMERGENCY DEPARTMENT ENCOUNTER       Pt Name: Mele Childress  MRN: [id number]  [place] 2000  Date of evaluation: 5/22/2023  Provider: HARRY Ardon   PCP: Irwin Meza MD  Note Started: 4:42 PM 5/22/23     CHIEF COMPLAINT       Chief Complaint   Patient presents with    Ankle Pain     L ankle         HISTORY OF PRESENT ILLNESS: 1 or more elements      History From: Patient  HPI Limitations: None     Mele Childress is a 23 y.o. female who presents with CC of left ankle pain after twisting the ankle stepping off of a curb yesterday. Initially she had pain with weightbearing yesterday, though today she is ambulatory without difficulty. Nursing Notes were all reviewed and agreed with or any disagreements were addressed in the HPI. REVIEW OF SYSTEMS      Review of Systems   Musculoskeletal:  Positive for arthralgias. Positives and Pertinent negatives as per HPI. PAST HISTORY     Past Medical History:  Past Medical History:   Diagnosis Date    ADHD (attention deficit hyperactivity disorder)     Allergic rhinitis     Asthma     Depression     Epilepsy ([facility] [address]     last at age 11    Hyperhidrosis     Non-insulin-dependent diabetes mellitus without complications     reports pre-diabetic    Pre-eclampsia, antepartum, third trimester 2/13/2021    Psychiatric disorder     ADHD    Seizures ([facility] [address]        Past Surgical History:  Past Surgical History:   Procedure Laterality Date    OTHER SURGICAL HISTORY      reports colon surgery \"flushed me out\"       Family History:  Family History   Problem Relation Age of Onset    Diabetes Neg Hx     Elevated Lipids Neg Hx     Hypertension Father     Hypertension Mother     Hypertension Maternal Grandmother     Seizures Brother     Thyroid Disease Neg Hx        Social History:  Social History     Tobacco Use    Smoking status: Never    Smokeless tobacco: Never   Substance Use Topics    Alcohol use: No    Drug use: No       Allergies:   Allergies

## 2023-08-27 ENCOUNTER — HOSPITAL ENCOUNTER (EMERGENCY)
Facility: HOSPITAL | Age: 23
Discharge: HOME | End: 2023-08-27
Payer: COMMERCIAL

## 2023-08-27 VITALS
WEIGHT: 177 LBS | BODY MASS INDEX: 32.57 KG/M2 | DIASTOLIC BLOOD PRESSURE: 73 MMHG | SYSTOLIC BLOOD PRESSURE: 136 MMHG | RESPIRATION RATE: 20 BRPM | HEART RATE: 84 BPM | HEIGHT: 62 IN | TEMPERATURE: 98.7 F | OXYGEN SATURATION: 99 %

## 2023-08-27 DIAGNOSIS — S39.012A STRAIN OF LUMBAR REGION, INITIAL ENCOUNTER: Primary | ICD-10-CM

## 2023-08-27 LAB
APPEARANCE UR: CLEAR
BACTERIA URNS QL MICRO: NEGATIVE /HPF
BILIRUB UR QL: NEGATIVE
COLOR UR: ABNORMAL
EPITH CASTS URNS QL MICRO: ABNORMAL /LPF
GLUCOSE UR STRIP.AUTO-MCNC: NEGATIVE MG/DL
HGB UR QL STRIP: NEGATIVE
KETONES UR QL STRIP.AUTO: NEGATIVE MG/DL
LEUKOCYTE ESTERASE UR QL STRIP.AUTO: ABNORMAL
NITRITE UR QL STRIP.AUTO: NEGATIVE
PH UR STRIP: 6.5 (ref 5–8)
PROT UR STRIP-MCNC: NEGATIVE MG/DL
RBC #/AREA URNS HPF: ABNORMAL /HPF (ref 0–5)
SP GR UR REFRACTOMETRY: 1.02
URINE CULTURE IF INDICATED: ABNORMAL
UROBILINOGEN UR QL STRIP.AUTO: 1 EU/DL (ref 0.2–1)
WBC URNS QL MICRO: ABNORMAL /HPF (ref 0–4)

## 2023-08-27 PROCEDURE — 96372 THER/PROPH/DIAG INJ SC/IM: CPT

## 2023-08-27 PROCEDURE — 99284 EMERGENCY DEPT VISIT MOD MDM: CPT

## 2023-08-27 PROCEDURE — 6360000002 HC RX W HCPCS: Performed by: PHYSICIAN ASSISTANT

## 2023-08-27 PROCEDURE — 81001 URINALYSIS AUTO W/SCOPE: CPT

## 2023-08-27 RX ORDER — CYCLOBENZAPRINE HCL 10 MG
10 TABLET ORAL 3 TIMES DAILY PRN
Qty: 15 TABLET | Refills: 0 | Status: SHIPPED | OUTPATIENT
Start: 2023-08-27

## 2023-08-27 RX ORDER — KETOROLAC TROMETHAMINE 30 MG/ML
30 INJECTION, SOLUTION INTRAMUSCULAR; INTRAVENOUS ONCE
Status: COMPLETED | OUTPATIENT
Start: 2023-08-27 | End: 2023-08-27

## 2023-08-27 RX ORDER — NAPROXEN 500 MG/1
500 TABLET ORAL EVERY 12 HOURS PRN
Qty: 20 TABLET | Refills: 0 | Status: SHIPPED | OUTPATIENT
Start: 2023-08-27

## 2023-08-27 RX ADMIN — KETOROLAC TROMETHAMINE 30 MG: 30 INJECTION, SOLUTION INTRAMUSCULAR; INTRAVENOUS at 14:11

## 2023-08-27 ASSESSMENT — PAIN DESCRIPTION - LOCATION: LOCATION: BACK

## 2023-08-27 ASSESSMENT — PAIN SCALES - GENERAL: PAINLEVEL_OUTOF10: 5

## 2023-08-27 ASSESSMENT — PAIN DESCRIPTION - ORIENTATION: ORIENTATION: LOWER

## 2023-08-27 ASSESSMENT — PAIN - FUNCTIONAL ASSESSMENT: PAIN_FUNCTIONAL_ASSESSMENT: 0-10

## 2023-08-27 NOTE — ED PROVIDER NOTES
[facility] EMERGENCY DEPT  EMERGENCY DEPARTMENT ENCOUNTER         Pt Name: Angela Summers  MRN: [id number]  [address] [facility] [place] 2000  Date of evaluation: 8/27/2023  Provider: Damien Milligan PA-C   PCP: Tiffany Mendoza MD  Note Started: 2:04 PM EDT 8/27/23     CHIEF COMPLAINT       Chief Complaint   Patient presents with    Back Pain        HISTORY OF PRESENT ILLNESS: 1 or more elements      History From: Patient  HPI Limitations: None     Angela Summers is a 26 y.o. female who presents lower back pain x2 days. 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 She is not sure if injury occurred after that. She denies any paresthesias to the arms or legs. No bowel or bladder incontinence, no abdominal pain, no dysuria or urinary frequency. She has been taking Tylenol and BC's with no relief. She rates discomfort 5 out of 10. Nursing Notes were all reviewed and agreed with or any disagreements were addressed in the HPI. REVIEW OF SYSTEMS      Review of Systems     Positives and Pertinent negatives as per HPI.     PAST HISTORY     Past Medical History:  Past Medical History:   Diagnosis Date    ADHD (attention deficit hyperactivity disorder)     Allergic rhinitis     Asthma     Depression     Epilepsy ([address])     last at age 11    Hyperhidrosis     Non-insulin-dependent diabetes mellitus without complications     reports pre-diabetic    Pre-eclampsia, antepartum, third trimester 2/13/2021    Psychiatric disorder     ADHD    Seizures ([address])        Past Surgical History:  Past Surgical History:   Procedure Laterality Date    OTHER SURGICAL HISTORY      reports colon surgery \"flushed me out\"       Family History:  Family History   Problem Relation Age of Onset    Diabetes Neg Hx     Elevated Lipids Neg Hx     Hypertension Father     Hypertension Mother     Hypertension Maternal Grandmother     Seizures Brother     Thyroid Disease Neg Hx        Social History:  Social

## 2023-08-27 NOTE — ED NOTES
VS: VSS  Neuro: no neuro deficits. A&Ox4  HEENT: WDL  Cardiovascular: WDL  Respiratory: WDL. Resp even and unlabored  Abdominal: WDL  Genitourinary: WDL- denies burning/frequency  Musculoskeletal: endorses lower back pain for a few days,.  Denies falls or injuries  Psych: appropriate for age    Appearance: well appearing and ambulatory to ED       Riya Quintanilla RN  08/27/23 6958

## 2023-08-27 NOTE — ED NOTES
Discharge instructions reviewed with patient. Patient did not have any further qeustions at this time. Pt is leaving dept in stable condition.        Jose Sue RN  08/27/23 1264

## 2023-09-21 ENCOUNTER — HOSPITAL ENCOUNTER (EMERGENCY)
Facility: HOSPITAL | Age: 23
Discharge: HOME | End: 2023-09-21
Payer: COMMERCIAL

## 2023-09-21 VITALS
HEART RATE: 76 BPM | WEIGHT: 174.16 LBS | SYSTOLIC BLOOD PRESSURE: 125 MMHG | RESPIRATION RATE: 16 BRPM | OXYGEN SATURATION: 100 % | BODY MASS INDEX: 30.86 KG/M2 | DIASTOLIC BLOOD PRESSURE: 64 MMHG | TEMPERATURE: 99.1 F | HEIGHT: 63 IN

## 2023-09-21 DIAGNOSIS — B96.89 BV (BACTERIAL VAGINOSIS): Primary | ICD-10-CM

## 2023-09-21 DIAGNOSIS — N76.0 BV (BACTERIAL VAGINOSIS): Primary | ICD-10-CM

## 2023-09-21 LAB
APPEARANCE UR: CLEAR
BACTERIA URNS QL MICRO: ABNORMAL /HPF
BILIRUB UR QL: NEGATIVE
CLUE CELLS VAG QL WET PREP: NORMAL
COLOR UR: ABNORMAL
EPITH CASTS URNS QL MICRO: ABNORMAL /LPF
GLUCOSE UR STRIP.AUTO-MCNC: NEGATIVE MG/DL
HCG UR QL: NEGATIVE
HGB UR QL STRIP: NEGATIVE
KETONES UR QL STRIP.AUTO: NEGATIVE MG/DL
KOH PREP SPEC: NORMAL
LEUKOCYTE ESTERASE UR QL STRIP.AUTO: ABNORMAL
NITRITE UR QL STRIP.AUTO: NEGATIVE
PH UR STRIP: 8 (ref 5–8)
PROT UR STRIP-MCNC: NEGATIVE MG/DL
RBC #/AREA URNS HPF: ABNORMAL /HPF (ref 0–5)
SERVICE CMNT-IMP: NORMAL
SP GR UR REFRACTOMETRY: 1.01
T VAGINALIS VAG QL WET PREP: NORMAL
URINE CULTURE IF INDICATED: ABNORMAL
UROBILINOGEN UR QL STRIP.AUTO: 1 EU/DL (ref 0.2–1)
WBC URNS QL MICRO: ABNORMAL /HPF (ref 0–4)

## 2023-09-21 PROCEDURE — 87591 N.GONORRHOEAE DNA AMP PROB: CPT

## 2023-09-21 PROCEDURE — 81001 URINALYSIS AUTO W/SCOPE: CPT

## 2023-09-21 PROCEDURE — 87210 SMEAR WET MOUNT SALINE/INK: CPT

## 2023-09-21 PROCEDURE — 81025 URINE PREGNANCY TEST: CPT

## 2023-09-21 PROCEDURE — 99283 EMERGENCY DEPT VISIT LOW MDM: CPT

## 2023-09-21 PROCEDURE — 87491 CHLMYD TRACH DNA AMP PROBE: CPT

## 2023-09-21 RX ORDER — METRONIDAZOLE 500 MG/1
500 TABLET ORAL 2 TIMES DAILY
Qty: 14 TABLET | Refills: 0 | Status: SHIPPED | OUTPATIENT
Start: 2023-09-21 | End: 2023-09-28

## 2023-09-21 ASSESSMENT — PAIN SCALES - GENERAL: PAINLEVEL_OUTOF10: 0

## 2023-09-21 ASSESSMENT — PAIN - FUNCTIONAL ASSESSMENT: PAIN_FUNCTIONAL_ASSESSMENT: 0-10

## 2023-09-22 LAB
C TRACH DNA SPEC QL NAA+PROBE: NEGATIVE
N GONORRHOEA DNA SPEC QL NAA+PROBE: NEGATIVE
SAMPLE TYPE: NORMAL
SERVICE CMNT-IMP: NORMAL
SPECIMEN SOURCE: NORMAL

## 2023-09-22 NOTE — ED NOTES
Discharge instructions were given to the patient by Seema Coker RN. The patient left the Emergency Department ambulatory, alert and oriented and in no acute distress with 1 prescriptions. The patient was encouraged to call or return to the ED for worsening issues or problems and was encouraged to schedule a follow up appointment for continuing care. The patient verbalized understanding of discharge instructions and prescriptions, all questions were answered. The patient has no further concerns at this time.         Ida Schafer RN  09/21/23 9007

## 2023-09-22 NOTE — ED PROVIDER NOTES
[facility] EMERGENCY DEPT  EMERGENCY DEPARTMENT ENCOUNTER       Pt Name: Keyla Norwood  MRN: [id number]  [address] [place] 2000  Date of evaluation: 9/21/2023  Provider: Irina Price PA-C   PCP: Shanna Lundberg MD  Note Started: 9:24 PM EDT 9/21/23     I have seen and evaluated the patient autonomously. My supervision physician was on site and available for consultation if needed. CHIEF COMPLAINT       Chief Complaint   Patient presents with    Vaginal Discharge        HISTORY OF PRESENT ILLNESS: 1 or more elements      History From: Patient  HPI Limitations None     Keyla Norwood is a 26 y.o. female who presents complaining of thickened, yellowish vaginal discharge x1 day. She denies known exposure to STD. She denies fever, chills, vaginal bleeding, foul-smelling vaginal discharge, dysuria, hematuria, urinary frequency, urinary urgency, pelvic pain, abdominal pain, diarrhea, constipation, abdominal rash, genital lesions. Nursing Notes were all reviewed and agreed with or any disagreements were addressed in the HPI. REVIEW OF SYSTEMS      Review of Systems     Positives and Pertinent negatives as per HPI.     PAST HISTORY     Past Medical History:  Past Medical History:   Diagnosis Date    ADHD (attention deficit hyperactivity disorder)     Allergic rhinitis     Asthma     Depression     Epilepsy ([address])     last at age 11    Hyperhidrosis     Non-insulin-dependent diabetes mellitus without complications     reports pre-diabetic    Pre-eclampsia, antepartum, third trimester 2/13/2021    Psychiatric disorder     ADHD    Seizures ([address])        Past Surgical History:  Past Surgical History:   Procedure Laterality Date    OTHER SURGICAL HISTORY      reports colon surgery \"flushed me out\"       Family History:  Family History   Problem Relation Age of Onset    Diabetes Neg Hx     Elevated Lipids Neg Hx     Hypertension Father     Hypertension Mother     Hypertension Maternal Grandmother     Seizures Brother

## 2023-10-20 ENCOUNTER — HOSPITAL ENCOUNTER (EMERGENCY)
Facility: HOSPITAL | Age: 23
Discharge: HOME | End: 2023-10-20

## 2023-10-20 VITALS
DIASTOLIC BLOOD PRESSURE: 68 MMHG | TEMPERATURE: 97.7 F | WEIGHT: 174 LBS | SYSTOLIC BLOOD PRESSURE: 123 MMHG | BODY MASS INDEX: 30.83 KG/M2 | RESPIRATION RATE: 15 BRPM | HEART RATE: 63 BPM | OXYGEN SATURATION: 98 % | HEIGHT: 63 IN

## 2023-10-20 ASSESSMENT — PAIN - FUNCTIONAL ASSESSMENT: PAIN_FUNCTIONAL_ASSESSMENT: NONE - DENIES PAIN

## 2023-10-20 NOTE — ED TRIAGE NOTES
Patient states she was diagnosed with covid 2 weeks ago. She states she has had congestion for past 2 weeks. Patient is a+ox4. Skin is warm and dry. Respirations are even and unlabored.

## 2023-10-25 ENCOUNTER — HOSPITAL ENCOUNTER (EMERGENCY)
Facility: HOSPITAL | Age: 23
Discharge: HOME | End: 2023-10-25
Attending: EMERGENCY MEDICINE
Payer: COMMERCIAL

## 2023-10-25 VITALS
BODY MASS INDEX: 31.18 KG/M2 | DIASTOLIC BLOOD PRESSURE: 63 MMHG | WEIGHT: 176 LBS | SYSTOLIC BLOOD PRESSURE: 118 MMHG | HEART RATE: 78 BPM | HEIGHT: 63 IN | TEMPERATURE: 98.9 F | OXYGEN SATURATION: 99 % | RESPIRATION RATE: 14 BRPM

## 2023-10-25 DIAGNOSIS — J06.9 VIRAL UPPER RESPIRATORY TRACT INFECTION: Primary | ICD-10-CM

## 2023-10-25 PROCEDURE — 99283 EMERGENCY DEPT VISIT LOW MDM: CPT

## 2023-10-25 RX ORDER — PREDNISONE 50 MG/1
50 TABLET ORAL DAILY
Qty: 4 TABLET | Refills: 0 | Status: SHIPPED | OUTPATIENT
Start: 2023-10-25 | End: 2023-10-29

## 2023-10-25 RX ORDER — ALBUTEROL SULFATE 90 UG/1
2 AEROSOL, METERED RESPIRATORY (INHALATION) EVERY 6 HOURS PRN
Qty: 18 G | Refills: 0 | Status: SHIPPED | OUTPATIENT
Start: 2023-10-25

## 2023-10-25 ASSESSMENT — PAIN - FUNCTIONAL ASSESSMENT: PAIN_FUNCTIONAL_ASSESSMENT: NONE - DENIES PAIN

## 2023-10-25 NOTE — ED PROVIDER NOTES
[address] EMERGENCY DEPT  EMERGENCY DEPARTMENT ENCOUNTER    Patient Name: Ying Cuevas  MRN: [id number]  YOB: 2000  Provider: Elsi Mcdonough MD  PCP: Antonio Piedra MD   Time/Date of evaluation: 5:31 PM EDT on 10/25/23    History of Presenting Illness     Chief Complaint   Patient presents with    Cough    Pharyngitis     History Provided by: Patient   History is limited by: Nothing    HISTORY (Narrative):   Ying Cuevas is a 26 y.o. female with a PMHX of ***  who presents to the emergency department (room 5) by POV C/O cough and mucous production for the past 3 weeks. ***     Nursing Notes were all reviewed and agreed with or any disagreements were addressed in the HPI. Past History     PAST MEDICAL HISTORY:  Past Medical History:   Diagnosis Date    ADHD (attention deficit hyperactivity disorder)     Allergic rhinitis     Asthma     Depression     Epilepsy ([address])     last at age 11    Hyperhidrosis     Non-insulin-dependent diabetes mellitus without complications     reports pre-diabetic    Pre-eclampsia, antepartum, third trimester 2/13/2021    Psychiatric disorder     ADHD    Seizures ([address])        PAST SURGICAL HISTORY:  Past Surgical History:   Procedure Laterality Date    OTHER SURGICAL HISTORY      reports colon surgery \"flushed me out\"       FAMILY HISTORY:  Family History   Problem Relation Age of Onset    Diabetes Neg Hx     Elevated Lipids Neg Hx     Hypertension Father     Hypertension Mother     Hypertension Maternal Grandmother     Seizures Brother     Thyroid Disease Neg Hx        SOCIAL HISTORY:  Social History     Tobacco Use    Smoking status: Never    Smokeless tobacco: Never   Substance Use Topics    Alcohol use: No    Drug use: No       MEDICATIONS:  No current facility-administered medications for this encounter.      Current Outpatient Medications   Medication Sig Dispense Refill    predniSONE (DELTASONE) 50 MG tablet Take 1 tablet by mouth daily for 4 days 4 tablet 0

## 2023-10-25 NOTE — ED NOTES
Discharge instructions were given to the patient by Albert Calvin. The patient left the Emergency Department ambulatory, alert and oriented and in no acute distress with 2 prescriptions. The patient was encouraged to call or return to the ED for worsening issues or problems and was encouraged to schedule a follow up appointment for continuing care. The patient verbalized understanding of discharge instructions and prescriptions, all questions were answered. The patient has no further concerns at this time.         Kimmie Kelley RN  10/25/23 5047

## 2023-10-25 NOTE — ED TRIAGE NOTES
Pt presents with sore throat and cough with yellow mucus x 3 weeks that she took Mucinex, benadryl, and sinus medication for.

## 2023-11-02 ENCOUNTER — HOSPITAL ENCOUNTER (EMERGENCY)
Facility: HOSPITAL | Age: 23
Discharge: HOME | End: 2023-11-02
Payer: COMMERCIAL

## 2023-11-02 ENCOUNTER — APPOINTMENT (OUTPATIENT)
Facility: HOSPITAL | Age: 23
End: 2023-11-02
Payer: COMMERCIAL

## 2023-11-02 VITALS
HEIGHT: 63 IN | HEART RATE: 95 BPM | BODY MASS INDEX: 30.92 KG/M2 | SYSTOLIC BLOOD PRESSURE: 136 MMHG | WEIGHT: 174.5 LBS | OXYGEN SATURATION: 98 % | TEMPERATURE: 98 F | DIASTOLIC BLOOD PRESSURE: 85 MMHG | RESPIRATION RATE: 16 BRPM

## 2023-11-02 DIAGNOSIS — J40 BRONCHITIS: Primary | ICD-10-CM

## 2023-11-02 PROCEDURE — 71046 X-RAY EXAM CHEST 2 VIEWS: CPT

## 2023-11-02 PROCEDURE — 99283 EMERGENCY DEPT VISIT LOW MDM: CPT

## 2023-11-02 RX ORDER — AZITHROMYCIN 250 MG/1
250 TABLET, FILM COATED ORAL SEE ADMIN INSTRUCTIONS
Qty: 6 TABLET | Refills: 0 | Status: SHIPPED | OUTPATIENT
Start: 2023-11-02 | End: 2023-11-07

## 2023-11-02 RX ORDER — GUAIFENESIN/DEXTROMETHORPHAN 100-10MG/5
5 SYRUP ORAL 3 TIMES DAILY PRN
Qty: 120 ML | Refills: 0 | Status: SHIPPED | OUTPATIENT
Start: 2023-11-02 | End: 2023-11-12

## 2023-11-02 RX ORDER — LORATADINE 10 MG/1
10 TABLET ORAL DAILY
Qty: 14 TABLET | Refills: 0 | Status: SHIPPED | OUTPATIENT
Start: 2023-11-02 | End: 2023-11-16

## 2023-11-02 ASSESSMENT — PAIN - FUNCTIONAL ASSESSMENT: PAIN_FUNCTIONAL_ASSESSMENT: NONE - DENIES PAIN

## 2023-11-02 NOTE — ED TRIAGE NOTES
Pt states she was recently seen here 2 weeks ago for a cough.  States she was unable to see her pcp and returns now for same cough

## 2023-11-03 NOTE — ED NOTES

## 2024-03-10 ENCOUNTER — HOSPITAL ENCOUNTER (EMERGENCY)
Facility: HOSPITAL | Age: 24
Discharge: HOME | End: 2024-03-10
Attending: EMERGENCY MEDICINE

## 2024-03-10 VITALS
DIASTOLIC BLOOD PRESSURE: 68 MMHG | HEART RATE: 99 BPM | BODY MASS INDEX: 32.02 KG/M2 | RESPIRATION RATE: 16 BRPM | WEIGHT: 174 LBS | OXYGEN SATURATION: 98 % | TEMPERATURE: 98.9 F | HEIGHT: 62 IN | SYSTOLIC BLOOD PRESSURE: 123 MMHG

## 2024-03-10 DIAGNOSIS — R09.81 NASAL CONGESTION: ICD-10-CM

## 2024-03-10 DIAGNOSIS — J45.21 MILD INTERMITTENT ASTHMA WITH ACUTE EXACERBATION: ICD-10-CM

## 2024-03-10 DIAGNOSIS — B34.9 ACUTE VIRAL SYNDROME: ICD-10-CM

## 2024-03-10 DIAGNOSIS — J06.9 ACUTE UPPER RESPIRATORY INFECTION: Primary | ICD-10-CM

## 2024-03-10 LAB
FLUAV RNA SPEC QL NAA+PROBE: NOT DETECTED
FLUBV RNA SPEC QL NAA+PROBE: NOT DETECTED
SARS-COV-2 RNA RESP QL NAA+PROBE: NOT DETECTED

## 2024-03-10 PROCEDURE — 6370000000 HC RX 637 (ALT 250 FOR IP): Performed by: EMERGENCY MEDICINE

## 2024-03-10 PROCEDURE — 99283 EMERGENCY DEPT VISIT LOW MDM: CPT

## 2024-03-10 PROCEDURE — 87636 SARSCOV2 & INF A&B AMP PRB: CPT

## 2024-03-10 RX ORDER — PREDNISONE 50 MG/1
50 TABLET ORAL DAILY
Qty: 5 TABLET | Refills: 0 | Status: SHIPPED | OUTPATIENT
Start: 2024-03-10 | End: 2024-03-15

## 2024-03-10 RX ORDER — PREDNISONE 20 MG/1
60 TABLET ORAL ONCE
Status: COMPLETED | OUTPATIENT
Start: 2024-03-10 | End: 2024-03-10

## 2024-03-10 RX ORDER — OXYMETAZOLINE HYDROCHLORIDE 0.05 G/100ML
2 SPRAY NASAL ONCE
Status: COMPLETED | OUTPATIENT
Start: 2024-03-10 | End: 2024-03-10

## 2024-03-10 RX ORDER — GUAIFENESIN 600 MG/1
600 TABLET, EXTENDED RELEASE ORAL 2 TIMES DAILY
Qty: 30 TABLET | Refills: 0 | Status: SHIPPED | OUTPATIENT
Start: 2024-03-10 | End: 2024-03-25

## 2024-03-10 RX ORDER — GUAIFENESIN 600 MG/1
600 TABLET, EXTENDED RELEASE ORAL
Status: COMPLETED | OUTPATIENT
Start: 2024-03-10 | End: 2024-03-10

## 2024-03-10 RX ORDER — ALBUTEROL SULFATE 90 UG/1
2 AEROSOL, METERED RESPIRATORY (INHALATION) ONCE
Status: COMPLETED | OUTPATIENT
Start: 2024-03-10 | End: 2024-03-10

## 2024-03-10 RX ORDER — ALBUTEROL SULFATE 90 UG/1
2 AEROSOL, METERED RESPIRATORY (INHALATION) 4 TIMES DAILY PRN
Qty: 54 G | Refills: 1 | Status: SHIPPED | OUTPATIENT
Start: 2024-03-10

## 2024-03-10 RX ADMIN — PREDNISONE 60 MG: 20 TABLET ORAL at 22:11

## 2024-03-10 RX ADMIN — ALBUTEROL SULFATE 2 PUFF: 90 AEROSOL, METERED RESPIRATORY (INHALATION) at 22:11

## 2024-03-10 RX ADMIN — GUAIFENESIN 600 MG: 600 TABLET ORAL at 22:11

## 2024-03-10 RX ADMIN — OXYMETAZOLINE HYDROCHLORIDE 2 SPRAY: 0.05 SPRAY, METERED NASAL at 22:11

## 2024-03-10 ASSESSMENT — PAIN SCALES - GENERAL: PAINLEVEL_OUTOF10: 0

## 2024-03-10 ASSESSMENT — PAIN - FUNCTIONAL ASSESSMENT: PAIN_FUNCTIONAL_ASSESSMENT: 0-10

## 2024-03-11 NOTE — ED NOTES
Discharge instructions were given to the patient by Norberto.     The patient left the Emergency Department ambulatory, alert and oriented and in no acute distress with 3 Paper prescriptions. The patient was encouraged to call or return to the ED for worsening issues or problems and was encouraged to schedule a follow up appointment for continuing care.     The patient verbalized understanding of discharge instructions and prescriptions, all questions were answered. The patient has no further concerns at this time.

## 2024-03-11 NOTE — ED NOTES
Pt presents to ED ambulatory complaining of Cough and nasal congestion x2 days. Pt is alert and oriented x 4, RR even and unlabored, skin  intact. Assessment completed and pt updated on plan of care.  Call bell in reach.       Emergency Department Nursing Plan of Care       The Nursing Plan of Care is developed from the Nursing assessment and Emergency Department Attending provider initial evaluation.  The plan of care may be reviewed in the “ED Provider note”.    The Plan of Care was developed with the following considerations:   Patient / Family readiness to learn indicated by:verbalized understanding  Persons(s) to be included in education: patient  Barriers to Learning/Limitations:No    Signed

## 2024-03-11 NOTE — ED PROVIDER NOTES
stable for discharge.     PLAN  1. Return precautions as discussed with patient and available family/caregiver.     2. DISCHARGE MEDICATIONS:     Medication List        START taking these medications      albuterol sulfate  (90 Base) MCG/ACT inhaler  Commonly known as: Ventolin HFA  Inhale 2 puffs into the lungs 4 times daily as needed for Wheezing     guaiFENesin 600 MG extended release tablet  Commonly known as: MUCINEX  Take 1 tablet by mouth 2 times daily for 15 days     predniSONE 50 MG tablet  Commonly known as: DELTASONE  Take 1 tablet by mouth daily for 5 days               Where to Get Your Medications        Information about where to get these medications is not yet available    Ask your nurse or doctor about these medications  albuterol sulfate  (90 Base) MCG/ACT inhaler  guaiFENesin 600 MG extended release tablet  predniSONE 50 MG tablet           3. PATIENT REFERRED TO:  [facility] EMERGENCY DEPT  [address] [place] [address]  [phone]    As needed, If symptoms worsen    [facility] EMERGENCY DEPT  [address] [place] [address]  [phone]                                CLINICAL IMPRESSION     1. Acute upper respiratory infection    2. Mild intermittent asthma with acute exacerbation    3. Acute viral syndrome    4. Nasal congestion      Attestation:  I am the attending of record for this patient. I personally performed the services described in this documentation on this date, 3/10/2024 for patient, Marci Oviedo. I have reviewed the chart and verified that the record is accurate and complete.      Neftali Arshad MD (Electronic Signature)         Neftali Arshad MD  03/10/24 7998

## 2024-05-11 ENCOUNTER — HOSPITAL ENCOUNTER (EMERGENCY)
Facility: HOSPITAL | Age: 24
Discharge: HOME | End: 2024-05-11
Attending: EMERGENCY MEDICINE
Payer: MEDICAID

## 2024-05-11 VITALS
TEMPERATURE: 98.8 F | RESPIRATION RATE: 16 BRPM | DIASTOLIC BLOOD PRESSURE: 73 MMHG | SYSTOLIC BLOOD PRESSURE: 120 MMHG | HEART RATE: 84 BPM | OXYGEN SATURATION: 100 %

## 2024-05-11 DIAGNOSIS — J06.9 VIRAL URI: Primary | ICD-10-CM

## 2024-05-11 LAB — DEPRECATED S PYO AG THROAT QL EIA: NEGATIVE

## 2024-05-11 PROCEDURE — 87070 CULTURE OTHR SPECIMN AEROBIC: CPT

## 2024-05-11 PROCEDURE — 87880 STREP A ASSAY W/OPTIC: CPT

## 2024-05-11 PROCEDURE — 99283 EMERGENCY DEPT VISIT LOW MDM: CPT

## 2024-05-11 RX ORDER — BENZONATATE 100 MG/1
100 CAPSULE ORAL 3 TIMES DAILY PRN
Qty: 30 CAPSULE | Refills: 0 | Status: SHIPPED | OUTPATIENT
Start: 2024-05-11 | End: 2024-05-21

## 2024-05-11 ASSESSMENT — PAIN - FUNCTIONAL ASSESSMENT: PAIN_FUNCTIONAL_ASSESSMENT: 0-10

## 2024-05-11 ASSESSMENT — PAIN SCALES - GENERAL: PAINLEVEL_OUTOF10: 7

## 2024-05-11 NOTE — ED TRIAGE NOTES
Patient arrive ambulatory to triage with cc of sore throat and mild cough since yesterday. 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

## 2024-05-11 NOTE — ED PROVIDER NOTES
[facility] EMERGENCY DEPT  EMERGENCY DEPARTMENT ENCOUNTER       Pt Name: Marci Oviedo  MRN: [id number]  Birthdate 2000  Date of evaluation: 5/11/2024  Provider: Edgardo Nelson MD   PCP: Ariel Bhatt MD  Note Started: 9:21 AM 5/11/24     CHIEF COMPLAINT       Chief Complaint   Patient presents with    Pharyngitis        HISTORY OF PRESENT ILLNESS: 1 or more elements      History From: Patient, History limited by: None     Marci Oviedo is a 23 y.o. female who presents with sore throat and cough.  She reports over the past 24 hours she has had mild cough, mild sore throat.  No fever, no chills no dyspnea no difficulty speaking.  Pain in her throat is worse with swallowing.  She has a history of asthma remotely.     Nursing Notes were all reviewed and agreed with or any disagreements were addressed in the HPI.     REVIEW OF SYSTEMS        Positives and Pertinent negatives as per HPI.    PAST HISTORY     Past Medical History:  Past Medical History:   Diagnosis Date    ADHD (attention deficit hyperactivity disorder)     Allergic rhinitis     Asthma     Depression     Epilepsy (HCC)     last at age 9    Hyperhidrosis     Non-insulin-dependent diabetes mellitus without complications     reports pre-diabetic    Pre-eclampsia, antepartum, third trimester 2/13/2021    Psychiatric disorder     ADHD    Seizures (HCC)        Past Surgical History:  Past Surgical History:   Procedure Laterality Date    OTHER SURGICAL HISTORY      reports colon surgery \"flushed me out\"       Family History:  Family History   Problem Relation Age of Onset    Diabetes Neg Hx     Elevated Lipids Neg Hx     Hypertension Father     Hypertension Mother     Hypertension Maternal Grandmother     Seizures Brother     Thyroid Disease Neg Hx        Social History:  Social History     Tobacco Use    Smoking status: Never    Smokeless tobacco: Never   Substance Use Topics    Alcohol use: No    Drug use: No       Allergies:  Allergies   Allergen  [place], [place] - [address] [facility] - P [phone] - F [phone]  [address] [facility] [address]      Phone: [phone]   benzonatate 100 MG capsule           DISCONTINUED MEDICATIONS:  Current Discharge Medication List          I am the Primary Clinician of Record.   Edgardo Nelson MD (electronically signed)    (Please note that parts of this dictation were completed with voice recognition software. Quite often unanticipated grammatical, syntax, homophones, and other interpretive errors are inadvertently transcribed by the computer software. Please disregards these errors. Please excuse any errors that have escaped final proofreading.)         Edgardo Nelson MD  05/11/24 0916

## 2024-05-11 NOTE — DISCHARGE INSTRUCTIONS
Thank You!    It was a pleasure taking care of you in our Emergency Department today. We know that when you come to our Emergency Department, you are entrusting us with your health, comfort, and safety. Our physicians and nurses honor that trust, and truly appreciate the opportunity to care for you and your loved ones.      We also value your feedback. If you receive a survey about your Emergency Department experience today, please fill it out.  We care about our patients' feedback, and we listen to what you have to say.  Thank you.    Edgardo Nelson MD  ________________________________________________________________________  I have included a copy of your lab results and/or radiologic studies from today's visit so you can have them easily available at your follow-up visit. We hope you feel better and please do not hesitate to contact the ED if you have any questions at all!    Recent Results (from the past 12 hour(s))   Rapid Strep Screen    Collection Time: 05/11/24  8:57 AM    Specimen: Swab; Throat   Result Value Ref Range    Strep A Ag Negative NEG       No orders to display       The exam and treatment you received in the Emergency Department were for an urgent problem and are not intended as complete care. It is important that you follow up with a doctor, nurse practitioner, or physician assistant for ongoing care. If your symptoms become worse or you do not improve as expected and you are unable to reach your usual health care provider, you should return to the Emergency Department. We are available 24 hours a day.    Please take your discharge instructions with you when you go to your follow-up appointment.     If a prescription has been provided, please have it filled as soon as possible to prevent a delay in treatment. Read the entire medication instruction sheet provided to you by the pharmacy. If you have any questions or reservations about taking the medication due to side effects or interactions with

## 2024-05-12 LAB
BACTERIA SPEC CULT: NORMAL
SERVICE CMNT-IMP: NORMAL

## 2024-05-13 LAB
BACTERIA SPEC CULT: NORMAL
SERVICE CMNT-IMP: NORMAL

## 2024-05-26 ENCOUNTER — HOSPITAL ENCOUNTER (EMERGENCY)
Facility: HOSPITAL | Age: 24
Discharge: HOME | End: 2024-05-26
Attending: EMERGENCY MEDICINE
Payer: MEDICAID

## 2024-05-26 VITALS
OXYGEN SATURATION: 99 % | HEIGHT: 62 IN | SYSTOLIC BLOOD PRESSURE: 114 MMHG | TEMPERATURE: 98.5 F | BODY MASS INDEX: 31.19 KG/M2 | RESPIRATION RATE: 16 BRPM | HEART RATE: 88 BPM | WEIGHT: 169.5 LBS | DIASTOLIC BLOOD PRESSURE: 65 MMHG

## 2024-05-26 DIAGNOSIS — J01.90 ACUTE NON-RECURRENT SINUSITIS, UNSPECIFIED LOCATION: ICD-10-CM

## 2024-05-26 DIAGNOSIS — J22 ACUTE RESPIRATORY INFECTION: Primary | ICD-10-CM

## 2024-05-26 DIAGNOSIS — J45.21 MILD INTERMITTENT ASTHMA WITH ACUTE EXACERBATION: ICD-10-CM

## 2024-05-26 PROCEDURE — 99283 EMERGENCY DEPT VISIT LOW MDM: CPT

## 2024-05-26 PROCEDURE — 6370000000 HC RX 637 (ALT 250 FOR IP): Performed by: EMERGENCY MEDICINE

## 2024-05-26 RX ORDER — PREDNISONE 20 MG/1
60 TABLET ORAL
Status: COMPLETED | OUTPATIENT
Start: 2024-05-26 | End: 2024-05-26

## 2024-05-26 RX ORDER — OXYMETAZOLINE HYDROCHLORIDE 0.05 G/100ML
2 SPRAY NASAL ONCE
Status: COMPLETED | OUTPATIENT
Start: 2024-05-26 | End: 2024-05-26

## 2024-05-26 RX ORDER — PREDNISONE 50 MG/1
50 TABLET ORAL DAILY
Qty: 5 TABLET | Refills: 0 | Status: SHIPPED | OUTPATIENT
Start: 2024-05-26 | End: 2024-05-31

## 2024-05-26 RX ORDER — GUAIFENESIN/DEXTROMETHORPHAN 100-10MG/5
5 SYRUP ORAL 3 TIMES DAILY PRN
Qty: 120 ML | Refills: 0 | Status: SHIPPED | OUTPATIENT
Start: 2024-05-26 | End: 2024-06-05

## 2024-05-26 RX ORDER — ALBUTEROL SULFATE 90 UG/1
2 AEROSOL, METERED RESPIRATORY (INHALATION) 4 TIMES DAILY PRN
Qty: 54 G | Refills: 1 | Status: SHIPPED | OUTPATIENT
Start: 2024-05-26

## 2024-05-26 RX ORDER — AZITHROMYCIN 250 MG/1
TABLET, FILM COATED ORAL
Qty: 6 TABLET | Refills: 0 | Status: SHIPPED | OUTPATIENT
Start: 2024-05-26 | End: 2024-06-05

## 2024-05-26 RX ADMIN — NASAL DECONGESTANT 2 SPRAY: 0.05 SPRAY NASAL at 21:32

## 2024-05-26 RX ADMIN — PREDNISONE 60 MG: 20 TABLET ORAL at 21:32

## 2024-05-26 ASSESSMENT — LIFESTYLE VARIABLES
HOW OFTEN DO YOU HAVE A DRINK CONTAINING ALCOHOL: NEVER
HOW MANY STANDARD DRINKS CONTAINING ALCOHOL DO YOU HAVE ON A TYPICAL DAY: PATIENT DOES NOT DRINK

## 2024-05-26 ASSESSMENT — PAIN - FUNCTIONAL ASSESSMENT: PAIN_FUNCTIONAL_ASSESSMENT: NONE - DENIES PAIN

## 2024-05-27 NOTE — ED NOTES
Discharge instructions were given to the patient by Marlo Penny RN .     The patient left the Emergency Department ambulatory, alert and oriented and in no acute distress with 4 prescriptions. The patient was encouraged to call or return to the ED for worsening issues or problems and was encouraged to schedule a follow up appointment for continuing care.     The patient verbalized understanding of discharge instructions and prescriptions, all questions were answered. The patient has no further concerns at this time.

## 2024-05-27 NOTE — ED NOTES
Pt presents ambulatory to ED complaining of cough and sore throat with cough. Pt stated she was just seen here for similar symptoms but has no relief. Pt was prescribed medication for cough here. No N/V/D. NAD. Airway patent. Moves all four extremities. Pt is alert and oriented x 4, RR even and unlabored, skin is warm and dry. Assessment completed and pt updated on plan of care.       Emergency Department Nursing Plan of Care       The Nursing Plan of Care is developed from the Nursing assessment and Emergency Department Attending provider initial evaluation.  The plan of care may be reviewed in the “ED Provider note”.    The Plan of Care was developed with the following considerations:   Patient / Family readiness to learn indicated by:verbalized understanding  Persons(s) to be included in education: patient  Barriers to Learning/Limitations:None    Signed     Marlo Penny RN    5/26/2024   8:55 PM

## 2024-05-27 NOTE — ED PROVIDER NOTES
quality of life, morbidity and mortality).  Clinical lab tests: ordered as appropriate, reviewed and interpreted by me  Tests in the radiology section of CPT®: ordered as appropriate, reviewed and interpreted by me   Tests in the medicine section of CPT®: ordered as appropriate, reviewed and interpreted by me   Review and summarize past medical records: yes    Risks  OTC drugs.  Prescription drug management.    Progress Note:  I have just re-evaluated the patient. Pt reports improvement of her symptoms after ED treatment. I have reviewed her vital signs and determined there is currently no worsening in their condition or physical exam. Results have been reviewed with them and their questions have been answered. I will continue to review further results as they come available.     ED Course:  ED Course as of 05/26/24 2116   Sun May 26, 2024   2114   Reviewed chart, patient was seen outside hospital February 16, 2021 for 35 weeks twin gestation.      [HW]   2114 Reviewed chart, patient was seen recently May 11, 2024 diagnosed with viral URI and discharged home on Tessalon Perles.  Seen by dermatology May 20, 2024 for primary focal hyperhidrosis. [HW]      ED Course User Index  [HW] Neftali Arshad MD      CONSULTS:   None    Shared Decision Making:   I considered x-ray imaging but presentation not consistent with acute pneumonia or acute bacterial infection.  Patient has a nonfocal pulm exam without hypoxia.    DISCHARGE  Pt reassessed and symptoms noted to have improved significantly after ED treatment. Marci Soliss labs and imaging have been reviewed with her and available family. She verbally conveys understanding and agreement of the signs, symptoms, diagnosis, treatment and prognosis and additionally agrees to follow up as recommended with Ariel Manzano MD and/or specialist as instructed. She agrees with the care plan we have created and conveys that all of her questions have been answered.

## 2024-06-08 ENCOUNTER — APPOINTMENT (OUTPATIENT)
Facility: HOSPITAL | Age: 24
End: 2024-06-08
Payer: COMMERCIAL

## 2024-06-08 ENCOUNTER — HOSPITAL ENCOUNTER (EMERGENCY)
Facility: HOSPITAL | Age: 24
Discharge: HOME | End: 2024-06-08
Attending: EMERGENCY MEDICINE
Payer: COMMERCIAL

## 2024-06-08 VITALS
RESPIRATION RATE: 18 BRPM | BODY MASS INDEX: 30 KG/M2 | WEIGHT: 169.31 LBS | HEIGHT: 63 IN | DIASTOLIC BLOOD PRESSURE: 72 MMHG | SYSTOLIC BLOOD PRESSURE: 137 MMHG | OXYGEN SATURATION: 100 % | TEMPERATURE: 99.1 F | HEART RATE: 90 BPM

## 2024-06-08 DIAGNOSIS — J18.9 ATYPICAL PNEUMONIA: Primary | ICD-10-CM

## 2024-06-08 LAB — HCG UR QL: NEGATIVE

## 2024-06-08 PROCEDURE — 71046 X-RAY EXAM CHEST 2 VIEWS: CPT

## 2024-06-08 PROCEDURE — 99283 EMERGENCY DEPT VISIT LOW MDM: CPT

## 2024-06-08 PROCEDURE — 81025 URINE PREGNANCY TEST: CPT

## 2024-06-08 RX ORDER — DOXYCYCLINE HYCLATE 100 MG
100 TABLET ORAL 2 TIMES DAILY
Qty: 10 TABLET | Refills: 0 | Status: SHIPPED | OUTPATIENT
Start: 2024-06-08 | End: 2024-06-13

## 2024-06-08 RX ORDER — BENZONATATE 100 MG/1
100 CAPSULE ORAL 2 TIMES DAILY PRN
Qty: 14 CAPSULE | Refills: 0 | Status: SHIPPED | OUTPATIENT
Start: 2024-06-08 | End: 2024-06-15

## 2024-06-08 ASSESSMENT — LIFESTYLE VARIABLES
HOW MANY STANDARD DRINKS CONTAINING ALCOHOL DO YOU HAVE ON A TYPICAL DAY: PATIENT DOES NOT DRINK
HOW OFTEN DO YOU HAVE A DRINK CONTAINING ALCOHOL: NEVER

## 2024-06-08 NOTE — DISCHARGE INSTRUCTIONS
Please take the antibiotics.  Use the cough medicine as needed.  Please follow-up with your primary care doctor and return for new or worsening symptoms anytime.

## 2024-06-08 NOTE — ED NOTES
Discharge instructions were given to the patient by [facility] MAIN RN.     The patient left the Emergency Department alert and oriented and in no acute distress with 2 prescriptions. The patient was encouraged to call or return to the ED for worsening issues or problems and was encouraged to schedule a follow up appointment for continuing care.     Ambulation assessment completed before discharge.  Pt left Emergency Department ambulating at baseline with no ortho devices  Ortho device education: none    The patient verbalized understanding of discharge instructions and prescriptions, all questions were answered. The patient has no further concerns at this time.

## 2024-06-08 NOTE — ED NOTES
Pt presents to ED complaining of cough and colds since 2 weeks ago. Pt states that she's  been taking Prednisone but didn't get better.Denies fever and shortness of breath.  Pt is alert and oriented x 4, RR even and unlabored, skin is warm and dry. Assesment completed and pt updated on plan of care.       Emergency Department Nursing Plan of Care       The Nursing Plan of Care is developed from the Nursing assessment and Emergency Department Attending provider initial evaluation.  The plan of care may be reviewed in the “ED Provider note”.    The Plan of Care was developed with the following considerations:   Presenting ambulatory assessment: Ambulating at baseline  Patient / Family readiness to learn indicated by: verbalized understanding  Persons(s) to be included in education: patient   Barriers to Learning/Limitations: None    Signed     [facility] RN    6/8/2024   8:48 AM

## 2024-06-08 NOTE — ED PROVIDER NOTES
Elevated Lipids Neg Hx     Hypertension Father     Hypertension Mother     Hypertension Maternal Grandmother     Seizures Brother     Thyroid Disease Neg Hx        Social History:  Social History     Tobacco Use    Smoking status: Never    Smokeless tobacco: Never   Substance Use Topics    Alcohol use: No    Drug use: No       Allergies:  Allergies   Allergen Reactions    Codeine Rash    Penicillins Rash       CURRENT MEDICATIONS      Discharge Medication List as of 6/8/2024 10:40 AM        CONTINUE these medications which have NOT CHANGED    Details   albuterol sulfate HFA (VENTOLIN HFA) 108 (90 Base) MCG/ACT inhaler Inhale 2 puffs into the lungs 4 times daily as needed for Wheezing, Disp-54 g, R-1Normal             SCREENINGS               No data recorded         PHYSICAL EXAM      ED Triage Vitals [06/08/24 0830]   Enc Vitals Group      /72      Pulse 90      Respirations 18      Temp 99.1 °F (37.3 °C)      Temp Source Oral      SpO2 100 %      Weight - Scale 76.8 kg (169 lb 5 oz)      Height 1.6 m (5' 3\")      Head Circumference       Peak Flow       Pain Score       Pain Loc       Pain Edu?       Excl. in GC?         Physical Exam  Vitals and nursing note reviewed.   Constitutional:       Comments: 23-year-old female, resting on stretcher, no acute distress   HENT:      Head: Normocephalic and atraumatic.      Nose: Nose normal.   Cardiovascular:      Rate and Rhythm: Normal rate and regular rhythm.   Pulmonary:      Effort: Pulmonary effort is normal. No respiratory distress.      Breath sounds: No wheezing, rhonchi or rales.      Comments: Frequent cough  Abdominal:      General: Abdomen is flat.   Musculoskeletal:         General: Normal range of motion.   Skin:     General: Skin is warm.   Neurological:      General: No focal deficit present.   Psychiatric:         Mood and Affect: Mood normal.          DIAGNOSTIC RESULTS   LABS:     Recent Results (from the past 24 hour(s))   POC Pregnancy Urine  [facility] [address]      Phone: [phone]   benzonatate 100 MG capsule  doxycycline hyclate 100 MG tablet           DISCONTINUED MEDICATIONS:  Discharge Medication List as of 6/8/2024 10:40 AM          I am the Primary Clinician of Record.   Aj Garcia MD (electronically signed)    (Please note that parts of this dictation were completed with voice recognition software. Quite often unanticipated grammatical, syntax, homophones, and other interpretive errors are inadvertently transcribed by the computer software. Please disregards these errors. Please excuse any errors that have escaped final proofreading.)         Aj Garcia MD  06/08/24 3937

## 2024-09-21 ENCOUNTER — HOSPITAL ENCOUNTER (EMERGENCY)
Facility: HOSPITAL | Age: 24
Discharge: HOME | End: 2024-09-21
Payer: COMMERCIAL

## 2024-09-21 VITALS
WEIGHT: 165.5 LBS | TEMPERATURE: 97.8 F | HEART RATE: 78 BPM | DIASTOLIC BLOOD PRESSURE: 83 MMHG | HEIGHT: 62 IN | OXYGEN SATURATION: 99 % | RESPIRATION RATE: 16 BRPM | BODY MASS INDEX: 30.46 KG/M2 | SYSTOLIC BLOOD PRESSURE: 125 MMHG

## 2024-09-21 DIAGNOSIS — J06.9 VIRAL URI WITH COUGH: Primary | ICD-10-CM

## 2024-09-21 PROCEDURE — 99283 EMERGENCY DEPT VISIT LOW MDM: CPT

## 2024-09-21 RX ORDER — AZITHROMYCIN 250 MG/1
TABLET, FILM COATED ORAL
Qty: 6 TABLET | Refills: 0 | Status: SHIPPED | OUTPATIENT
Start: 2024-09-21 | End: 2024-10-01

## 2024-09-21 RX ORDER — CETIRIZINE HYDROCHLORIDE 10 MG/1
10 TABLET ORAL DAILY
Qty: 30 TABLET | Refills: 0 | Status: SHIPPED | OUTPATIENT
Start: 2024-09-21

## 2024-09-21 ASSESSMENT — PAIN - FUNCTIONAL ASSESSMENT: PAIN_FUNCTIONAL_ASSESSMENT: 0-10

## 2024-09-21 ASSESSMENT — PAIN DESCRIPTION - DESCRIPTORS: DESCRIPTORS: SORE

## 2024-09-21 ASSESSMENT — PAIN DESCRIPTION - LOCATION: LOCATION: THROAT

## 2024-09-21 ASSESSMENT — PAIN DESCRIPTION - PAIN TYPE: TYPE: ACUTE PAIN

## 2024-09-21 ASSESSMENT — PAIN DESCRIPTION - ORIENTATION: ORIENTATION: MID

## 2024-09-21 ASSESSMENT — PAIN SCALES - GENERAL: PAINLEVEL_OUTOF10: 7

## 2024-11-06 ENCOUNTER — HOSPITAL ENCOUNTER (EMERGENCY)
Facility: HOSPITAL | Age: 24
Discharge: HOME | End: 2024-11-06
Payer: COMMERCIAL

## 2024-11-06 VITALS
WEIGHT: 160.5 LBS | HEART RATE: 93 BPM | HEIGHT: 62 IN | BODY MASS INDEX: 29.53 KG/M2 | OXYGEN SATURATION: 100 % | RESPIRATION RATE: 14 BRPM | DIASTOLIC BLOOD PRESSURE: 79 MMHG | SYSTOLIC BLOOD PRESSURE: 127 MMHG | TEMPERATURE: 99.6 F

## 2024-11-06 DIAGNOSIS — J00 ACUTE NASOPHARYNGITIS: Primary | ICD-10-CM

## 2024-11-06 LAB
FLUAV RNA SPEC QL NAA+PROBE: NOT DETECTED
FLUBV RNA SPEC QL NAA+PROBE: NOT DETECTED
S PYO DNA THROAT QL NAA+PROBE: NOT DETECTED
SARS-COV-2 RNA RESP QL NAA+PROBE: NOT DETECTED
SOURCE: NORMAL

## 2024-11-06 PROCEDURE — 87636 SARSCOV2 & INF A&B AMP PRB: CPT

## 2024-11-06 PROCEDURE — 99283 EMERGENCY DEPT VISIT LOW MDM: CPT

## 2024-11-06 PROCEDURE — 87651 STREP A DNA AMP PROBE: CPT

## 2024-11-06 RX ORDER — ACETAMINOPHEN 500 MG
1000 TABLET ORAL EVERY 6 HOURS PRN
Qty: 20 TABLET | Refills: 0 | Status: SHIPPED | OUTPATIENT
Start: 2024-11-06

## 2024-11-06 RX ORDER — IBUPROFEN 800 MG/1
800 TABLET, FILM COATED ORAL EVERY 8 HOURS PRN
Qty: 20 TABLET | Refills: 0 | Status: SHIPPED | OUTPATIENT
Start: 2024-11-06

## 2024-11-06 RX ORDER — DEXTROMETHORPHAN HBR AND GUAIFENESIN 5; 100 MG/5ML; MG/5ML
5 LIQUID ORAL
Qty: 118 ML | Refills: 0 | Status: SHIPPED | OUTPATIENT
Start: 2024-11-06

## 2024-11-06 ASSESSMENT — ENCOUNTER SYMPTOMS
WHEEZING: 0
NAUSEA: 0
CHEST TIGHTNESS: 0
BACK PAIN: 0
SINUS PAIN: 0
SHORTNESS OF BREATH: 0
TROUBLE SWALLOWING: 0
COUGH: 1
ABDOMINAL PAIN: 0
SINUS PRESSURE: 0
VOMITING: 0
VOICE CHANGE: 0
EYE PAIN: 0
FACIAL SWELLING: 0
RHINORRHEA: 1
SORE THROAT: 1
DIARRHEA: 0

## 2024-11-06 ASSESSMENT — PAIN - FUNCTIONAL ASSESSMENT: PAIN_FUNCTIONAL_ASSESSMENT: NONE - DENIES PAIN

## 2024-11-06 NOTE — ED PROVIDER NOTES
the following medications:  Medications - No data to display    CONSULTS: (Who and What was discussed)  None      Chronic Conditions: ADHD, seizures, asthma, depression, prediabetes, allergic rhinitis     Social Determinants affecting Dx or Tx: None    Records Reviewed (source and summary): Nursing Notes, Old Medical Records, Previous Radiology Studies, and Previous Laboratory Studies    CC/HPI Summary, DDx, ED Course, and Reassessment: Well-appearing afebrile and hemodynamically stable 24-year-old female presents with acute moderate aching sore throat, rhinorrhea, cough, congestion and productive yellow sputum X 3 days.  Works at a nursing home.  No medications or modifying factors.  No fever, chills, nausea, vomiting, chest pain, shortness of breath, wheezing.  Mild nasal congestion and rhinorrhea on exam but otherwise unremarkable exam.  Normal oropharynx.  No trismus, tripoding, drooling, muffled voice, stridor, uvular shift or exudate.  Normal heart lung sounds with no adventitious lung sounds.  Suspect uncomplicated viral URI.  Differential includes COVID, flu, strep, candidiasis, bronchitis.  Will treat with symptomatic care.           Disposition Considerations (Tests not done, Shared Decision Making, Pt Expectation of Test or Tx.):     Progress Note:   Updated pt on all returned results and findings. Discussed the importance of proper follow up as referred below along with return precautions. Pt in agreement with the care plan and expresses agreement with and understanding of all items discussed.    FINAL IMPRESSION     1. Acute nasopharyngitis          DISPOSITION/PLAN   Marci Oviedo's  results have been reviewed with her.  She has been counseled regarding her diagnosis, treatment, and plan.  She verbally conveys understanding and agreement of the signs, symptoms, diagnosis, treatment and prognosis and additionally agrees to follow up as discussed.  She also agrees with the care-plan and conveys that

## 2024-11-06 NOTE — ED TRIAGE NOTES
Pt arrives ambulatory to triage with productive cough with yellow sputum and sore throat x 3 days.

## 2024-12-11 ENCOUNTER — HOSPITAL ENCOUNTER (EMERGENCY)
Facility: HOSPITAL | Age: 24
Discharge: HOME | End: 2024-12-11
Payer: COMMERCIAL

## 2024-12-11 VITALS
TEMPERATURE: 98.9 F | BODY MASS INDEX: 29.63 KG/M2 | HEIGHT: 62 IN | DIASTOLIC BLOOD PRESSURE: 68 MMHG | RESPIRATION RATE: 17 BRPM | OXYGEN SATURATION: 99 % | SYSTOLIC BLOOD PRESSURE: 116 MMHG | HEART RATE: 72 BPM | WEIGHT: 161 LBS

## 2024-12-11 DIAGNOSIS — J06.9 ACUTE UPPER RESPIRATORY INFECTION: Primary | ICD-10-CM

## 2024-12-11 LAB
APPEARANCE UR: ABNORMAL
BACTERIA URNS QL MICRO: ABNORMAL /HPF
BILIRUB UR QL: NEGATIVE
COLOR UR: ABNORMAL
EPITH CASTS URNS QL MICRO: ABNORMAL /LPF
FLUAV RNA SPEC QL NAA+PROBE: NOT DETECTED
FLUBV RNA SPEC QL NAA+PROBE: NOT DETECTED
GLUCOSE UR STRIP.AUTO-MCNC: NEGATIVE MG/DL
HCG UR QL: NEGATIVE
HGB UR QL STRIP: NEGATIVE
KETONES UR QL STRIP.AUTO: NEGATIVE MG/DL
LEUKOCYTE ESTERASE UR QL STRIP.AUTO: ABNORMAL
NITRITE UR QL STRIP.AUTO: NEGATIVE
PH UR STRIP: 6.5 (ref 5–8)
PROT UR STRIP-MCNC: NEGATIVE MG/DL
RBC #/AREA URNS HPF: ABNORMAL /HPF (ref 0–5)
SARS-COV-2 RNA RESP QL NAA+PROBE: NOT DETECTED
SOURCE: NORMAL
SP GR UR REFRACTOMETRY: 1.02
URINE CULTURE IF INDICATED: ABNORMAL
UROBILINOGEN UR QL STRIP.AUTO: 1 EU/DL (ref 0.2–1)
WBC URNS QL MICRO: ABNORMAL /HPF (ref 0–4)

## 2024-12-11 PROCEDURE — 99283 EMERGENCY DEPT VISIT LOW MDM: CPT

## 2024-12-11 PROCEDURE — 81001 URINALYSIS AUTO W/SCOPE: CPT

## 2024-12-11 PROCEDURE — 81025 URINE PREGNANCY TEST: CPT

## 2024-12-11 PROCEDURE — 87636 SARSCOV2 & INF A&B AMP PRB: CPT

## 2024-12-11 RX ORDER — BENZONATATE 100 MG/1
100 CAPSULE ORAL 3 TIMES DAILY PRN
Qty: 15 CAPSULE | Refills: 0 | Status: SHIPPED | OUTPATIENT
Start: 2024-12-11 | End: 2024-12-16

## 2024-12-11 RX ORDER — PSEUDOEPHEDRINE HCL 30 MG/1
30 TABLET, FILM COATED ORAL EVERY 6 HOURS PRN
Qty: 15 TABLET | Refills: 0 | Status: SHIPPED | OUTPATIENT
Start: 2024-12-11 | End: 2024-12-16

## 2024-12-11 ASSESSMENT — PAIN DESCRIPTION - ORIENTATION
ORIENTATION: ANTERIOR
ORIENTATION_2: MID

## 2024-12-11 ASSESSMENT — PAIN SCALES - GENERAL: PAINLEVEL_OUTOF10: 6

## 2024-12-11 ASSESSMENT — PAIN DESCRIPTION - LOCATION
LOCATION: HEAD
LOCATION_2: ABDOMEN

## 2024-12-11 ASSESSMENT — PAIN DESCRIPTION - INTENSITY: RATING_2: 6

## 2024-12-11 ASSESSMENT — PAIN DESCRIPTION - DESCRIPTORS
DESCRIPTORS_2: CRAMPING
DESCRIPTORS: ACHING

## 2024-12-11 ASSESSMENT — PAIN - FUNCTIONAL ASSESSMENT: PAIN_FUNCTIONAL_ASSESSMENT: 0-10

## 2024-12-12 NOTE — ED NOTES
Discharge instructions were given to the patient by sara lipscomb.     The patient left the Emergency Department alert and oriented and in no acute distress with 2 prescriptions. The patient was encouraged to call or return to the ED for worsening issues or problems and was encouraged to schedule a follow up appointment for continuing care.     Ambulation assessment completed before discharge.  Pt left Emergency Department ambulating at baseline with no ortho devices  Ortho device education: none    The patient verbalized understanding of discharge instructions and prescriptions, all questions were answered. The patient has no further concerns at this time.

## 2024-12-12 NOTE — ED NOTES
See triage note. Pt is alert and oriented x 4, RR even and unlabored, skin is warm and dry. Assesment completed and pt updated on plan of care.       Emergency Department Nursing Plan of Care       The Nursing Plan of Care is developed from the Nursing assessment and Emergency Department Attending provider initial evaluation.  The plan of care may be reviewed in the “ED Provider note”.    The Plan of Care was developed with the following considerations:   Patient / Family readiness to learn indicated by:verbalized understanding  Persons(s) to be included in education: patient  Barriers to Learning/Limitations:None    Signed     Gladys Livingston RN    12/11/2024   9:41 PM

## 2024-12-12 NOTE — ED TRIAGE NOTES
Pt presents to ED with CC cough, headache, abd cramping x2-3 days  Pt denies any sick contact  Pt denies N/V/D  Pt denies taking medication for symptoms.

## 2024-12-12 NOTE — ED PROVIDER NOTES
[facility] EMERGENCY DEPT  EMERGENCY DEPARTMENT ENCOUNTER       Pt Name: Marci Oviedo  MRN: [id number]  Birthdate 2000  Date of evaluation: 12/11/2024  Provider: HARRY Zaidi   PCP: Ariel Bhatt MD  Note Started:  9:58 PM EST 12/11/24     CHIEF COMPLAINT       Chief Complaint   Patient presents with    Cough     With HA, intermittent abd cramping x2-3 days        HISTORY OF PRESENT ILLNESS: 1 or more elements      History From: Patient  HPI Limitations: None     Marci Oviedo is a 24 y.o. nonsmoking female who presents with CC of congestion, cough-- \"just a little cold.\" She would like to get a work note for today. Denies fever, ST, HA, SOB, GI sxs, abdominal pain, urinary sxs. No meds PTA.      Nursing Notes were all reviewed and agreed with or any disagreements were addressed in the HPI.     REVIEW OF SYSTEMS      Review of Systems   HENT:  Positive for congestion.         Positives and Pertinent negatives as per HPI.    PAST HISTORY     Past Medical History:  Past Medical History:   Diagnosis Date    ADHD (attention deficit hyperactivity disorder)     Allergic rhinitis     Asthma     Depression     Epilepsy (HCC)     last at age 9    Hyperhidrosis     Non-insulin-dependent diabetes mellitus without complications     reports pre-diabetic    Pre-eclampsia, antepartum, third trimester 2/13/2021    Psychiatric disorder     ADHD    Seizures (HCC)        Past Surgical History:  Past Surgical History:   Procedure Laterality Date    OTHER SURGICAL HISTORY      reports colon surgery \"flushed me out\"       Family History:  Family History   Problem Relation Age of Onset    Diabetes Neg Hx     Elevated Lipids Neg Hx     Hypertension Father     Hypertension Mother     Hypertension Maternal Grandmother     Seizures Brother     Thyroid Disease Neg Hx        Social History:  Social History     Tobacco Use    Smoking status: Never    Smokeless tobacco: Never   Substance Use Topics    Alcohol use: No    Drug use:

## 2024-12-22 ENCOUNTER — HOSPITAL ENCOUNTER (EMERGENCY)
Facility: HOSPITAL | Age: 24
Discharge: HOME | End: 2024-12-22
Payer: COMMERCIAL

## 2024-12-22 VITALS
WEIGHT: 161 LBS | RESPIRATION RATE: 18 BRPM | HEIGHT: 62 IN | HEART RATE: 99 BPM | TEMPERATURE: 99.4 F | OXYGEN SATURATION: 97 % | BODY MASS INDEX: 29.63 KG/M2 | SYSTOLIC BLOOD PRESSURE: 114 MMHG | DIASTOLIC BLOOD PRESSURE: 65 MMHG

## 2024-12-22 DIAGNOSIS — R68.89 FLU-LIKE SYMPTOMS: Primary | ICD-10-CM

## 2024-12-22 PROCEDURE — 6370000000 HC RX 637 (ALT 250 FOR IP): Performed by: NURSE PRACTITIONER

## 2024-12-22 PROCEDURE — 87651 STREP A DNA AMP PROBE: CPT

## 2024-12-22 PROCEDURE — 87636 SARSCOV2 & INF A&B AMP PRB: CPT

## 2024-12-22 PROCEDURE — 99283 EMERGENCY DEPT VISIT LOW MDM: CPT

## 2024-12-22 RX ORDER — ACETAMINOPHEN 500 MG
1000 TABLET ORAL
Status: COMPLETED | OUTPATIENT
Start: 2024-12-22 | End: 2024-12-22

## 2024-12-22 RX ORDER — IBUPROFEN 600 MG/1
600 TABLET, FILM COATED ORAL EVERY 6 HOURS PRN
Qty: 30 TABLET | Refills: 0 | Status: SHIPPED | OUTPATIENT
Start: 2024-12-22

## 2024-12-22 RX ADMIN — ACETAMINOPHEN 1000 MG: 500 TABLET ORAL at 20:49

## 2024-12-22 ASSESSMENT — PAIN DESCRIPTION - PAIN TYPE: TYPE: ACUTE PAIN

## 2024-12-22 ASSESSMENT — PAIN - FUNCTIONAL ASSESSMENT: PAIN_FUNCTIONAL_ASSESSMENT: 0-10

## 2024-12-22 ASSESSMENT — PAIN DESCRIPTION - LOCATION: LOCATION: BACK;HEAD

## 2024-12-22 ASSESSMENT — PAIN SCALES - GENERAL: PAINLEVEL_OUTOF10: 7

## 2024-12-23 ASSESSMENT — ENCOUNTER SYMPTOMS
SHORTNESS OF BREATH: 0
BACK PAIN: 0
ABDOMINAL PAIN: 0
DIARRHEA: 1
COUGH: 1

## 2024-12-23 NOTE — ED NOTES
Pt presents to ED ambulatory complaining of cold like symptoms. Body aches, chills, fever, cough and diarrhea 3-5 times today. Pt is alert and oriented x 4, RR even and unlabored, skin  intact. Assessment completed and pt updated on plan of care.  Call bell in reach.       Emergency Department Nursing Plan of Care       The Nursing Plan of Care is developed from the Nursing assessment and Emergency Department Attending provider initial evaluation.  The plan of care may be reviewed in the “ED Provider note”.    The Plan of Care was developed with the following considerations:   Patient / Family readiness to learn indicated by:verbalized understanding  Persons(s) to be included in education: patient  Barriers to Learning/Limitations:No    Signed

## 2024-12-23 NOTE — ED NOTES
Discharge instructions were given to the patient by [facility] RN.     The patient left the Emergency Department alert and oriented and in no acute distress with 1 prescriptions. The patient was encouraged to call or return to the ED for worsening issues or problems and was encouraged to schedule a follow up appointment for continuing care.     Ambulation assessment completed before discharge.  Pt left Emergency Department ambulating at baseline with no ortho devices  Ortho device education: none    The patient verbalized understanding of discharge instructions and prescriptions, all questions were answered. The patient has no further concerns at this time.

## 2024-12-24 NOTE — ED PROVIDER NOTES
[facility] EMERGENCY DEPT  EMERGENCY DEPARTMENT ENCOUNTER       Pt Name: Marci Oviedo  MRN: [id number]  Birthdate 2000  Date of evaluation: 12/22/2024  Provider: SHAZIA Downing NP   PCP: Ariel Bhatt MD  Note Started: 8:12 PM 12/23/24     CHIEF COMPLAINT       Chief Complaint   Patient presents with    Fever    Diarrhea    Cough        HISTORY OF PRESENT ILLNESS: 1 or more elements      History Provided by: Patient   History is limited by: Nothing     Marci Oviedo is a 24 y.o. 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     REVIEW OF SYSTEMS      Review of Systems   Constitutional:  Positive for fever.   HENT:  Negative for congestion.    Eyes:  Negative for visual disturbance.   Respiratory:  Positive for cough. Negative for shortness of breath.    Cardiovascular:  Negative for chest pain.   Gastrointestinal:  Positive for diarrhea. Negative for abdominal pain.   Musculoskeletal:  Negative for back pain and neck pain.   Skin:  Negative for rash.   Neurological:  Negative for dizziness, weakness and headaches.   All other systems reviewed and are negative.       Positives and Pertinent negatives as per HPI.    PAST HISTORY     Past Medical History:  Past Medical History:   Diagnosis Date    ADHD (attention deficit hyperactivity disorder)     Allergic rhinitis     Asthma     Depression     Epilepsy (HCC)     last at age 9    Hyperhidrosis     Non-insulin-dependent diabetes mellitus without complications     reports pre-diabetic    Pre-eclampsia, antepartum, third trimester 2/13/2021    Psychiatric disorder     ADHD    Seizures (HCC)        Past Surgical History:  Past Surgical History:   Procedure Laterality Date    OTHER SURGICAL HISTORY      reports colon surgery \"flushed me out\"       Family History:  Family History   Problem

## 2025-07-04 ENCOUNTER — HOSPITAL ENCOUNTER (EMERGENCY)
Facility: HOSPITAL | Age: 25
Discharge: HOME | End: 2025-07-04
Attending: EMERGENCY MEDICINE
Payer: MEDICAID

## 2025-07-04 VITALS
WEIGHT: 184 LBS | TEMPERATURE: 98.4 F | OXYGEN SATURATION: 100 % | RESPIRATION RATE: 16 BRPM | DIASTOLIC BLOOD PRESSURE: 85 MMHG | BODY MASS INDEX: 33.86 KG/M2 | HEART RATE: 76 BPM | SYSTOLIC BLOOD PRESSURE: 129 MMHG | HEIGHT: 62 IN

## 2025-07-04 DIAGNOSIS — N89.8 VAGINAL DISCHARGE: Primary | ICD-10-CM

## 2025-07-04 LAB
CLUE CELLS VAG QL WET PREP: NORMAL
HCG UR QL: NEGATIVE
T VAGINALIS VAG QL WET PREP: NORMAL
YEAST WET PREP: NORMAL

## 2025-07-04 PROCEDURE — 87591 N.GONORRHOEAE DNA AMP PROB: CPT

## 2025-07-04 PROCEDURE — 99283 EMERGENCY DEPT VISIT LOW MDM: CPT

## 2025-07-04 PROCEDURE — 87210 SMEAR WET MOUNT SALINE/INK: CPT

## 2025-07-04 PROCEDURE — 6370000000 HC RX 637 (ALT 250 FOR IP): Performed by: EMERGENCY MEDICINE

## 2025-07-04 PROCEDURE — 87491 CHLMYD TRACH DNA AMP PROBE: CPT

## 2025-07-04 PROCEDURE — 81025 URINE PREGNANCY TEST: CPT

## 2025-07-04 RX ORDER — METRONIDAZOLE 500 MG/1
500 TABLET ORAL
Status: COMPLETED | OUTPATIENT
Start: 2025-07-04 | End: 2025-07-04

## 2025-07-04 RX ORDER — METRONIDAZOLE 500 MG/1
500 TABLET ORAL 2 TIMES DAILY
Qty: 14 TABLET | Refills: 0 | Status: SHIPPED | OUTPATIENT
Start: 2025-07-04 | End: 2025-07-11

## 2025-07-04 RX ADMIN — METRONIDAZOLE 500 MG: 500 TABLET ORAL at 08:25

## 2025-07-04 ASSESSMENT — PAIN SCALES - GENERAL: PAINLEVEL_OUTOF10: 0

## 2025-07-04 ASSESSMENT — PAIN - FUNCTIONAL ASSESSMENT: PAIN_FUNCTIONAL_ASSESSMENT: 0-10

## 2025-07-04 NOTE — ED NOTES
Pt presents to ED complaining of BV. Pt reports white vaginal discharge, fishy odor and itchiness started 4 days pain. Pt denies abdominal pain, pain upon urination, nausea and vomiting. Pt is alert and oriented x 4, RR even and unlabored, skin is warm and dry. Assesment completed and pt updated on plan of care.       Emergency Department Nursing Plan of Care       The Nursing Plan of Care is developed from the Nursing assessment and Emergency Department Attending provider initial evaluation.  The plan of care may be reviewed in the “ED Provider note”.    The Plan of Care was developed with the following considerations:   Presenting ambulatory assessment: Ambulating at baseline  Patient / Family readiness to learn indicated by: verbalized understanding  Persons(s) to be included in education: patient   Barriers to Learning/Limitations: None    Signed     [facility] RN    7/4/2025   8:21 AM

## 2025-07-04 NOTE — ED PROVIDER NOTES
Psychiatric disorder     ADHD    Seizures (HCC)        Past Surgical History:   Procedure Laterality Date    OTHER SURGICAL HISTORY      reports colon surgery \"flushed me out\"       Social History     Tobacco Use    Smoking status: Never    Smokeless tobacco: Never   Substance Use Topics    Alcohol use: No    Drug use: No       Allergies:  Allergies   Allergen Reactions    Codeine Rash    Penicillins Rash     Review of Systems   A Review of Systems was reviewed by me today during this encounter.  Pertinent positive and negative elements are noted in the HPI and MDM sections.    Review of Systems   Genitourinary:  Positive for vaginal discharge.   All other systems reviewed and are negative.      Physical Exam   Vital Signs  Patient Vitals for the past 8 hrs:   Temp Pulse Resp BP SpO2   07/04/25 0749 98.4 °F (36.9 °C) 76 16 129/85 100 %          Physical Exam  Vitals reviewed.   Constitutional:       General: She is not in acute distress.     Appearance: She is not ill-appearing.   Abdominal:      Palpations: Abdomen is soft.      Tenderness: There is no abdominal tenderness.   Neurological:      General: No focal deficit present.      Mental Status: She is oriented to person, place, and time.         Diagnostic Study Results   Labs  Results for orders placed or performed during the hospital encounter of 07/04/25   Wet prep, genital    Specimen: Miscellaneous sample   Result Value Ref Range    Clue Cells, Wet Prep NONE SEEN NOSEE      Trich, Wet Prep NONE SEEN NOSEE      Yeast, Wet Prep NONE SEEN NOSEE     POC Pregnancy Urine Qual   Result Value Ref Range    Preg Test, Ur Negative NEG        ==============================================================    Radiologic Studies  No orders to display          Critical Care and Billable Procedures   EKG reviewed by ED Physician Contreras in the absence of a cardiologist: Yes  EKG below was independently interpreted by me (Naveed Contreras MD)    Procedures    Medical  Decision Making   I reviewed the patient's most recent Emergency Dept notes and diagnostic tests in formulating my MDM on today's visit.    Medications administered during ED course:  Medications   metroNIDAZOLE (FLAGYL) tablet 500 mg (500 mg Oral Given 7/4/25 0825)     Medical Decision Making // ED Course // Reassessment:  Marci Oviedo, 24 y.o. female Complains of a few days of foul-smelling grayish-white vaginal discharge.  She believes that she has BV, which she typically gets about once a year, which is also causing some vaginal irritation.  She is currently sexually active with 1 partner.     exam was deferred.  Normal vital signs, no distress.    Her wet prep does not show any signs of yeast or BV.    She would prefer to wait for further testing for GC and chlamydia rather than being empirically treated with antibiotics.    Patient feels strongly that she has had similar symptoms in the past that cleared up with metronidazole.  A course of Flagyl seems reasonable.      I will be the attending of record for this patient.          Naveed Contreras MD      Additional documentation if relevant for this encounter     ED Course Updates      Diagnosis and Disposition     Disposition: Decision To Discharge 07/04/2025 08:40:33 AM     Final Diagnosis:   1. Vaginal discharge           Medication List        START taking these medications      Boric Acid 600 MG Supp  Place 1 suppository vaginally at bedtime for 7 days     metroNIDAZOLE 500 MG tablet  Commonly known as: FLAGYL  Take 1 tablet by mouth 2 times daily for 7 days            ASK your doctor about these medications      acetaminophen 500 MG tablet  Commonly known as: TYLENOL  Take 2 tablets by mouth every 6 hours as needed for Pain     albuterol sulfate  (90 Base) MCG/ACT inhaler  Commonly known as: Ventolin HFA  Inhale 2 puffs into the lungs 4 times daily as needed for Wheezing     cetirizine 10 MG tablet  Commonly known as: ZYRTEC  Take 1 tablet

## 2025-07-04 NOTE — DISCHARGE INSTRUCTIONS
It was a pleasure taking care of you at [facility] Emergency Department today.      We know that when you come to [facility], you are entrusting us with your health, comfort, and safety.  Our physicians and nurses honor that trust, and we appreciate the opportunity to care for you and your loved ones.  We also value your feedback, and we would like to hear from you.    When you receive a  >>> survey <<< about your Emergency Department experience today, please fill it out. We review every single response from our patients. Thank you!    Sincerely,  Dr. Naveed Contreras MD

## 2025-08-25 ENCOUNTER — HOSPITAL ENCOUNTER (EMERGENCY)
Facility: HOSPITAL | Age: 25
Discharge: HOME | End: 2025-08-25
Payer: MEDICAID

## 2025-08-25 VITALS
SYSTOLIC BLOOD PRESSURE: 138 MMHG | HEART RATE: 80 BPM | WEIGHT: 182.98 LBS | TEMPERATURE: 98.2 F | DIASTOLIC BLOOD PRESSURE: 79 MMHG | BODY MASS INDEX: 33.47 KG/M2 | RESPIRATION RATE: 16 BRPM | OXYGEN SATURATION: 100 %

## 2025-08-25 DIAGNOSIS — R53.83 OTHER FATIGUE: Primary | ICD-10-CM

## 2025-08-25 PROCEDURE — 99282 EMERGENCY DEPT VISIT SF MDM: CPT

## 2025-08-25 ASSESSMENT — PAIN SCALES - GENERAL: PAINLEVEL_OUTOF10: 0

## 2025-08-25 ASSESSMENT — PAIN - FUNCTIONAL ASSESSMENT: PAIN_FUNCTIONAL_ASSESSMENT: 0-10

## (undated) DEVICE — ROYALSILK SURGICAL GOWN, L: Brand: CONVERTORS

## (undated) DEVICE — EXTRA LARGE, DISPOSABLE C-SECTION PROTECTOR - RETRACTOR: Brand: ALEXIS ® O C-SECTION PROTECTOR - RETRACTOR

## (undated) DEVICE — COVERALL PREM SMS 2XL KNIT --

## (undated) DEVICE — DRESSING AQUACEL ADVANTAGE ALG W4XL5IN RECT GREATER ABSRB HYDRFBR TECHNOLOGY

## (undated) DEVICE — SOLUTION IV 1000ML 0.9% SOD CHL

## (undated) DEVICE — STERILE POLYISOPRENE POWDER-FREE SURGICAL GLOVES: Brand: PROTEXIS

## (undated) DEVICE — (D)PREP SKN CHLRAPRP APPL 26ML -- CONVERT TO ITEM 371833

## (undated) DEVICE — PACK PROCEDURE SURG C SECT KT SMH

## (undated) DEVICE — ANESTHESIA TRAY SPNL W/O NDL PENCAN

## (undated) DEVICE — STERILE POLYISOPRENE POWDER-FREE SURGICAL GLOVES WITH EMOLLIENT COATING: Brand: PROTEXIS

## (undated) DEVICE — REM POLYHESIVE ADULT PATIENT RETURN ELECTRODE: Brand: VALLEYLAB

## (undated) DEVICE — DEVON™ KNEE AND BODY STRAP 60" X 3" (1.5 M X 7.6 CM): Brand: DEVON

## (undated) DEVICE — 3000CC GUARDIAN II: Brand: GUARDIAN

## (undated) DEVICE — SUTURE MCRYL SZ 0 L36IN ABSRB VLT L48MM CTX 1/2 CIR Y398H

## (undated) DEVICE — CATH FOLEY 16F LUBRI-SIL IC --

## (undated) DEVICE — SUTURE VCRL SZ 2-0 L36IN ABSRB VLT L36MM CT-1 1/2 CIR J345H

## (undated) DEVICE — SPONGE: LAP 18X18 W  200/CS: Brand: MEDICAL ACTION INDUSTRIES

## (undated) DEVICE — Device: Brand: PORTEX

## (undated) DEVICE — LIGHT HANDLE: Brand: DEVON

## (undated) DEVICE — SOLUTION IRRIG 1000ML H2O STRL BLT

## (undated) DEVICE — SUTURE VCRL SZ 0 L36IN ABSRB VLT L40MM CT 1/2 CIR J358H

## (undated) DEVICE — SOLIDIFIER MEDC 1200ML -- CONVERT TO 356117

## (undated) DEVICE — STAPLER SKIN SQ 30 ABSRB STPL DISP INSORB

## (undated) DEVICE — DRAPE FLD WRM W44XL66IN C6L FOR INTRATEMP SYS THERMABASIN

## (undated) DEVICE — KENDALL SCD EXPRESS SLEEVES, KNEE LENGTH, MEDIUM: Brand: KENDALL SCD

## (undated) DEVICE — ELECTROSURGICAL DEVICE HOLSTER;FOR USE WITH MAXIMUM PEAK VOLTAGE OF 4000 V: Brand: FORCE TRIVERSE

## (undated) DEVICE — SUTURE MCRYL SZ 4-0 L27IN ABSRB UD L24MM PS-1 3/8 CIR PRIM Y935H

## (undated) DEVICE — ROYAL SILK SURGICAL GOWN, XXL: Brand: CONVERTORS